# Patient Record
Sex: FEMALE | Race: WHITE | Employment: OTHER | URBAN - METROPOLITAN AREA
[De-identification: names, ages, dates, MRNs, and addresses within clinical notes are randomized per-mention and may not be internally consistent; named-entity substitution may affect disease eponyms.]

---

## 2018-07-19 ENCOUNTER — OFFICE VISIT (OUTPATIENT)
Dept: FAMILY MEDICINE CLINIC | Age: 58
End: 2018-07-19

## 2018-07-19 VITALS
HEART RATE: 68 BPM | HEIGHT: 65 IN | BODY MASS INDEX: 31.32 KG/M2 | DIASTOLIC BLOOD PRESSURE: 59 MMHG | OXYGEN SATURATION: 98 % | WEIGHT: 188 LBS | TEMPERATURE: 97.8 F | SYSTOLIC BLOOD PRESSURE: 107 MMHG | RESPIRATION RATE: 16 BRPM

## 2018-07-19 DIAGNOSIS — Z12.11 SCREENING FOR COLORECTAL CANCER: ICD-10-CM

## 2018-07-19 DIAGNOSIS — R15.9 URINARY AND FECAL INCONTINENCE: Primary | ICD-10-CM

## 2018-07-19 DIAGNOSIS — R32 URINARY AND FECAL INCONTINENCE: Primary | ICD-10-CM

## 2018-07-19 DIAGNOSIS — Z12.31 VISIT FOR SCREENING MAMMOGRAM: ICD-10-CM

## 2018-07-19 DIAGNOSIS — Z12.12 SCREENING FOR COLORECTAL CANCER: ICD-10-CM

## 2018-07-19 DIAGNOSIS — E55.9 VITAMIN D DEFICIENCY: ICD-10-CM

## 2018-07-19 DIAGNOSIS — S09.90XD INJURY OF HEAD, SUBSEQUENT ENCOUNTER: ICD-10-CM

## 2018-07-19 DIAGNOSIS — H54.50: ICD-10-CM

## 2018-07-19 PROBLEM — M19.90 ARTHRITIS: Status: ACTIVE | Noted: 2018-07-19

## 2018-07-19 PROBLEM — S09.90XA HEAD INJURY: Status: ACTIVE | Noted: 2018-07-19

## 2018-07-19 PROBLEM — F31.81 BIPOLAR 2 DISORDER (HCC): Status: ACTIVE | Noted: 2018-07-19

## 2018-07-19 PROBLEM — M72.2 PLANTAR FASCIITIS: Status: ACTIVE | Noted: 2018-07-19

## 2018-07-19 PROBLEM — Z86.69 H/O MIGRAINE: Status: ACTIVE | Noted: 2018-07-19

## 2018-07-19 RX ORDER — TRAZODONE HYDROCHLORIDE 50 MG/1
TABLET ORAL
COMMUNITY

## 2018-07-19 RX ORDER — SERTRALINE HYDROCHLORIDE 50 MG/1
TABLET, FILM COATED ORAL DAILY
COMMUNITY
End: 2022-09-22

## 2018-07-19 RX ORDER — ERGOCALCIFEROL 1.25 MG/1
50000 CAPSULE ORAL
Qty: 4 CAP | Refills: 2 | Status: SHIPPED | OUTPATIENT
Start: 2018-07-19 | End: 2018-10-17

## 2018-07-19 NOTE — ACP (ADVANCE CARE PLANNING)
Advance Care Planning (ACP) Provider Note - Comprehensive     Date of ACP Conversation: 07/19/18  Persons included in Conversation:  patient  Length of ACP Conversation in minutes:  Five minutes              General ACP for ALL Patients with Decision Making Capacity:   Importance of advance care planning, including choosing a healthcare agent to communicate patient's healthcare decisions if patient lost the ability to make decisions, such as after a sudden illness or accident  \"In these circumstances, what matters most to you? \"  Care focused more on comfort or quality of life.         For Serious or Chronic Illness:  Understanding of medical condition      Interventions Provided:  Recommended completion of Advance Directive form after review of ACP materials and conversation with prospective healthcare agent   Recommended communicating the plan and making copies for the healthcare agent, personal physician, and others as appropriate (e.g., health system)  Recommended review of completed ACP document annually or upon change in health status

## 2018-07-19 NOTE — PROGRESS NOTES
Chief Complaint   Patient presents with    Establish Care    Loss of Vision     Rapid Vision Loss within one month     1. Have you been to the ER, urgent care clinic since your last visit? Hospitalized since your last visit? No    2. Have you seen or consulted any other health care providers outside of the [address] [place] [address] since your last visit? Include any pap smears or colon screening.  Yes Where: [facility]

## 2018-07-19 NOTE — PROGRESS NOTES
HISTORY OF PRESENT ILLNESS  Thom Rodriguez is a 59 y.o. female. HPI Comments: Patient is here to establish care. She mentions she is on gap insurance and is homeless. She currently lives in car. Patient does see a psychiatrist at present and she is on medications. Patient is taking her medications. She has had recent blood work done and I have reviewed this. Patient mentions her current issue today is urinary and fecal incontinence. She is unsure if this is attributed to being exposed to pesticides or her trazodone. Patient mentions she does get a signal to go to the bathroom and just passes urine / stool. I have asked her to stop the trazodone and see how she feels. She also mentions on Monday she is going on for pre op work up for cataracts as she is having visual loss. Establish Care   Pertinent negatives include no chest pain, no abdominal pain, no headaches and no shortness of breath. Loss of Vision    Associated symptoms include blurred vision. Pertinent negatives include no discharge, no nausea, no fever, no pain and no dizziness. Review of Systems   Constitutional: Negative for chills, diaphoresis, fever and malaise/fatigue. HENT: Negative for congestion, ear discharge, ear pain, hearing loss, sinus pain and sore throat. Eyes: Positive for blurred vision. Negative for pain and discharge. Respiratory: Negative for cough, sputum production, shortness of breath and wheezing. Cardiovascular: Negative for chest pain, palpitations, claudication and leg swelling. Gastrointestinal: Negative for abdominal pain, diarrhea, heartburn and nausea. Genitourinary: Positive for flank pain and urgency. Negative for dysuria, frequency and hematuria. Musculoskeletal: Negative for joint pain. Neurological: Negative for dizziness and headaches. Endo/Heme/Allergies: Negative for environmental allergies. Psychiatric/Behavioral: Positive for depression and suicidal ideas.  Negative for hallucinations, memory loss and substance abuse. The patient is nervous/anxious and has insomnia. Visit Vitals    /59    Pulse 68    Temp 97.8 °F (36.6 °C) (Oral)    Resp 16    Ht 5' 5\" (1.651 m)    Wt 188 lb (85.3 kg)    SpO2 98%    BMI 31.28 kg/m2         Physical Exam   Constitutional: She is oriented to person, place, and time. She appears well-developed and well-nourished. HENT:   Head: Normocephalic and atraumatic. Right Ear: External ear normal.   Left Ear: External ear normal.   Mouth/Throat: Oropharynx is clear and moist. No oropharyngeal exudate. Eyes: EOM are normal. Pupils are equal, round, and reactive to light. No scleral icterus. Neck: No thyromegaly present. Cardiovascular: Normal rate, regular rhythm and normal heart sounds. Pulmonary/Chest: Effort normal and breath sounds normal. No respiratory distress. She has no wheezes. Abdominal: Soft. Bowel sounds are normal. She exhibits no distension. There is no tenderness. Lymphadenopathy:     She has no cervical adenopathy. Neurological: She is alert and oriented to person, place, and time. Psychiatric: She has a normal mood and affect.        ASSESSMENT and PLAN  Urinary and fecal incontinence :  - Referral to urology   - Please stop trazodone at present to see of this helps your symptoms    Visual decline /cataract :  - Please make sure you follow up with eye doctor    Mammogram screening:   Has been ordered    Colorectal cancer screening :  - Fit test has been handed to patient   - Testing has been explained to patient

## 2018-07-19 NOTE — PROGRESS NOTES
Patient is here to establish care with new pcp. 1. Have you been to the ER, urgent care clinic since your last visit? Hospitalized since your last visit? no    2. Have you seen or consulted any other health care providers outside of the [address] [place] [address] since your last visit? Include any pap smears or colon screening.  no

## 2018-07-24 ENCOUNTER — TELEPHONE (OUTPATIENT)
Dept: FAMILY MEDICINE CLINIC | Age: 58
End: 2018-07-24

## 2022-03-19 PROBLEM — M72.2 PLANTAR FASCIITIS: Status: ACTIVE | Noted: 2018-07-19

## 2022-03-19 PROBLEM — M19.90 ARTHRITIS: Status: ACTIVE | Noted: 2018-07-19

## 2022-03-19 PROBLEM — Z86.69 H/O MIGRAINE: Status: ACTIVE | Noted: 2018-07-19

## 2022-03-20 PROBLEM — S09.90XA HEAD INJURY: Status: ACTIVE | Noted: 2018-07-19

## 2022-03-20 PROBLEM — F31.81 BIPOLAR 2 DISORDER (HCC): Status: ACTIVE | Noted: 2018-07-19

## 2022-10-04 ENCOUNTER — OFFICE VISIT (OUTPATIENT)
Dept: INTERNAL MEDICINE CLINIC | Age: 62
End: 2022-10-04
Payer: MEDICARE

## 2022-10-04 VITALS
WEIGHT: 187.4 LBS | TEMPERATURE: 97.2 F | DIASTOLIC BLOOD PRESSURE: 67 MMHG | RESPIRATION RATE: 16 BRPM | HEIGHT: 64 IN | SYSTOLIC BLOOD PRESSURE: 122 MMHG | OXYGEN SATURATION: 99 % | HEART RATE: 70 BPM | BODY MASS INDEX: 31.99 KG/M2

## 2022-10-04 DIAGNOSIS — N39.3 STRESS INCONTINENCE: ICD-10-CM

## 2022-10-04 DIAGNOSIS — R82.90 ABNORMAL URINE FINDING: ICD-10-CM

## 2022-10-04 DIAGNOSIS — R35.0 FREQUENCY OF URINATION AND POLYURIA: ICD-10-CM

## 2022-10-04 DIAGNOSIS — E66.9 OBESITY, CLASS I, BMI 30-34.9: ICD-10-CM

## 2022-10-04 DIAGNOSIS — R35.89 FREQUENCY OF URINATION AND POLYURIA: ICD-10-CM

## 2022-10-04 DIAGNOSIS — Z11.59 ENCOUNTER FOR HEPATITIS C SCREENING TEST FOR LOW RISK PATIENT: ICD-10-CM

## 2022-10-04 DIAGNOSIS — E78.5 HYPERLIPIDEMIA, UNSPECIFIED HYPERLIPIDEMIA TYPE: ICD-10-CM

## 2022-10-04 DIAGNOSIS — D50.9 IRON DEFICIENCY ANEMIA, UNSPECIFIED IRON DEFICIENCY ANEMIA TYPE: ICD-10-CM

## 2022-10-04 DIAGNOSIS — Z76.89 ESTABLISHING CARE WITH NEW DOCTOR, ENCOUNTER FOR: Primary | ICD-10-CM

## 2022-10-04 DIAGNOSIS — M54.50 ACUTE LOW BACK PAIN WITHOUT SCIATICA, UNSPECIFIED BACK PAIN LATERALITY: ICD-10-CM

## 2022-10-04 DIAGNOSIS — Z12.11 ENCOUNTER FOR SCREENING COLONOSCOPY: ICD-10-CM

## 2022-10-04 PROCEDURE — 99204 OFFICE O/P NEW MOD 45 MIN: CPT | Performed by: INTERNAL MEDICINE

## 2022-10-04 RX ORDER — NAPROXEN 250 MG/1
250 TABLET ORAL 2 TIMES DAILY WITH MEALS
Qty: 20 TABLET | Refills: 0 | Status: SHIPPED | OUTPATIENT
Start: 2022-10-04

## 2022-10-04 RX ORDER — SERTRALINE HYDROCHLORIDE 25 MG/1
TABLET, FILM COATED ORAL DAILY
COMMUNITY

## 2022-10-04 NOTE — PROGRESS NOTES
HISTORY OF PRESENT ILLNESS  Miriam Saul is a 57 y.o. female. Patient comes to establish PCP. Patient tells me she had a recent fall couple of weeks ago she went to [facility] ED, physical examination was benign, she was given naproxen for the back pain and sent home to follow-up with primary care physician. She also would like to have referral to GYN for annual women's evaluation. She noticed increasing urinary frequency, she tells me that they are found to abnormalities in urine analysis recently and she would like to check again since urine frequency is still present. She has not noticed escape of urine when coughing or laughing. She brought records from [facility] 07/02/2021: TSH within normal limits 1.6, cholesterol panel elevated, CMP within normal limits [GFR 59.6] total protein [6.1], hemoglobin 11.2. Recurrent syncope/near syncope  Also cured when standing for prolonged times, she feels strange sensation over her body right before having the near syncope and she sits on the floor so she does not fall. She has followed with cardiologist and neurologist, cardiopathy was ruled out, she got brain MRI and CT head unremarkable per her comments. Stress incontinence  Urine losing when coughing or laughing for a few years so far. She will make appointment with GYN for pessary treatment possibly. Anxiety and depression  She is taking sertraline and trazodone, dose has been decreasing over the years, following with behavioral [facility]. Acute on chronic low back pain  She recently had a fall went to emergency at [facility], given naproxen. She will willing to try physical therapy. Left TMJ  No causing problems at this point.     Anemia 2/2 iron deficiency and history of pernicious anemia on B12 over-the-counter      PAST MEDICAL HISTORY  Medical: Anxiety with depression, pernicious anemia, iron deficiency, left TMJ, back muscle spasms, hyperlipidemia, recurrent vasovagal syncope/near syncope. Surgical: Right shoulder rotator cuff repair. Allergies: Lidocaine and prednisone. Medications: sertraline, trazodone, over-the-counter vitamins. Work: Recently retired. Family: Mother had CHF and C. difficile, dad with osteoarthritis. Social: Denies tobacco, denies alcohol, denied recreational drugs. Sexual: Single, not active, no children, STI negative. Anna  She will make appointment by her own with GYN for annual women's appointment. She will have evaluation Pap smear mammogram to be faxed to our office. Appreciated. Vaccination: Patient refused flu vaccine, Tdap, pneumococcal vaccine, COVID-vaccine. Review of Systems   Respiratory:  Negative for cough and shortness of breath. Cardiovascular:  Negative for chest pain, palpitations and leg swelling. Genitourinary:  Positive for flank pain, frequency and urgency. Visit Vitals  /67 (BP 1 Location: Left upper arm, BP Patient Position: Sitting)   Pulse 70   Temp 97.2 °F (36.2 °C) (Temporal)   Resp 16   Ht 5' 4\" (1.626 m)   Wt 187 lb 6.4 oz (85 kg)   SpO2 99%   BMI 32.17 kg/m²     Physical Exam  Constitutional:       Appearance: Normal appearance. HENT:      Mouth/Throat:      Mouth: Mucous membranes are moist.      Pharynx: Oropharynx is clear. Eyes:      Extraocular Movements: Extraocular movements intact. Conjunctiva/sclera: Conjunctivae normal.      Pupils: Pupils are equal, round, and reactive to light. Cardiovascular:      Rate and Rhythm: Normal rate and regular rhythm. Pulses: Normal pulses. Heart sounds: Normal heart sounds. Pulmonary:      Effort: Pulmonary effort is normal.      Breath sounds: Normal breath sounds. Abdominal:      General: Bowel sounds are normal.      Palpations: Abdomen is soft. Musculoskeletal:      Cervical back: Normal range of motion. Comments: Mild tenderness bilaterally around lumbar spine and midline.   No pain radiation to lower extremities, no weakness in lower extremities, no loss of urinary or fecal his fingers control. Lymphadenopathy:      Cervical: No cervical adenopathy. Skin:     General: Skin is warm. Neurological:      Mental Status: She is alert and oriented to person, place, and time. Psychiatric:         Mood and Affect: Mood normal.       ASSESSMENT and PLAN    ICD-10-CM ICD-9-CM    1. Establishing care with new doctor, encounter for  Z76.89 V65.8       2. Iron deficiency anemia, unspecified iron deficiency anemia type  D50.9 280.9 IRON PROFILE      FERRITIN      3. Abnormal urine finding  R82.90 791.9 URINALYSIS W/ RFLX MICROSCOPIC      4. Frequency of urination and polyuria  R35.0 788.41 URINALYSIS W/ RFLX MICROSCOPIC    R35.89 788.42       5. Stress incontinence  N39.3 [id number]       6. Obesity, Class I, BMI 30-34.9  E66.9 278.00       7. Encounter for hepatitis C screening test for low risk patient  Z11.59 V73.89 HEPATITIS C AB      8. Encounter for screening colonoscopy  Z12.11 V76.51 REFERRAL TO GASTROENTEROLOGY      9. Hyperlipidemia, unspecified hyperlipidemia type  E78.5 272.4 LIPID PANEL      10. Acute low back pain without sciatica, unspecified back pain laterality  M54.50 724.2 REFERRAL TO PHYSICAL THERAPY      naproxen (NAPROSYN) 250 mg tablet        Follow-up and Dispositions    Return in about 1 year (around 10/4/2023) for Also as needed. .     Patient comes to establish PCP. Patient complaining of increased urinary frequency, will repeat urine analysis. She presents symptoms of chronic stress incontinence for the last few years unchanged. She will make appointment with GYN for this. To make appointment with GYN for annual women's evaluation. She is getting refills for trazodone and sertraline from behavioral health clinic at [facility]. Blood work properly ordered. Referral to physical therapy and naproxen prescribed for back pain acute on chronic.   She brought records from [facility] 07/02/2021: TSH within normal limits 1.6, cholesterol panel elevated, CMP within normal limits [GFR 59.6] total protein [6.1], hemoglobin 11.2. Labs sent for scanning. Colonoscopy referral sent. Follow-up in 1 year for annual physical or as needed.

## 2022-10-04 NOTE — PROGRESS NOTES
Joann Dyer is a 57 y.o.  female presents today for office visit for establish care. Pt would also like to discuss back pain. Pt is not fasting. Pt is in Room # 10      1. Have you been to the ER, urgent care clinic since your last visit? Hospitalized since your last visit? No    2. Have you seen or consulted any other health care providers outside of the [address] [place] [address] since your last visit? Include any pap smears or colon screening. No    Upcoming Appts  none    Health Maintenance reviewed     VORB: No orders of the defined types were placed in this encounter.   Cheri Cook LPN

## 2022-10-07 LAB
BACTERIA,BACTU: PRESENT
BILIRUB UR QL: NEGATIVE
CHOLEST SERPL-MCNC: 188 MG/DL (ref 110–200)
CLARITY: CLEAR
COLOR UR: YELLOW
EPITHELIAL,EPSU: ABNORMAL /HPF (ref 0–2)
FE % SATURATION,PSAT: 20 % (ref 20–50)
FERRITIN SERPL-MCNC: 38 NG/ML (ref 10–291)
GLUCOSE UR QL: NEGATIVE MG/DL
HCV AB SER IA-ACNC: NORMAL
HDLC SERPL-MCNC: 3.2 MG/DL (ref 0–5)
HDLC SERPL-MCNC: 58 MG/DL
HGB UR QL STRIP: NEGATIVE
HYLINE CAST, 6014: ABNORMAL /LPF (ref 0–2)
IRON,IRN: 77 MCG/DL (ref 30–160)
KETONES UR QL STRIP.AUTO: NEGATIVE MG/DL
LDL/HDL RATIO,LDHD: 1.9
LDLC SERPL CALC-MCNC: 110 MG/DL (ref 50–99)
LEUKOCYTE ESTERASE: NEGATIVE
NITRITE UR QL STRIP.AUTO: NEGATIVE
NON-HDL CHOLESTEROL, 011976: 130 MG/DL
PH UR STRIP: 5.5 PH (ref 5–8)
PROT UR QL STRIP: NEGATIVE MG/DL
RBC #/AREA URNS HPF: ABNORMAL /HPF
SP GR UR: 1.02 (ref 1–1.03)
TIBC,TIBC: 387 MCG/DL (ref 228–428)
TRIGL SERPL-MCNC: 98 MG/DL (ref 40–149)
UIBC SERPL-MCNC: 310 MCG/DL (ref 110–370)
UROBILINOGEN UR STRIP-MCNC: 0.2 MG/DL
VLDLC SERPL CALC-MCNC: 20 MG/DL (ref 8–30)
WBC URNS QL MICRO: ABNORMAL /HPF (ref 0–5)

## 2022-10-07 NOTE — PROGRESS NOTES
Lipid profile, LDL cholesterol was slightly increased which can be easily controlled by healthy diet, reducing fats in diet reducing sugars and walking 30 minutes every day or most of the days. Iron profile was normal.  No need to use iron supplements. Urinalysis was unremarkable. I believe urinary frequency is related to increased water intake and also stress incontinence. Please follow-up with GYN for management. Please call our office if you have any questions.

## 2022-10-11 ENCOUNTER — HOSPITAL ENCOUNTER (OUTPATIENT)
Dept: PHYSICAL THERAPY | Age: 62
Discharge: HOME | End: 2022-10-11
Payer: MEDICARE

## 2022-10-11 PROCEDURE — 97162 PT EVAL MOD COMPLEX 30 MIN: CPT

## 2022-10-11 NOTE — PROGRESS NOTES
PT DAILY TREATMENT NOTE     Patient Name: Parker El  GZQS:  : 1960  [x]  Patient  Verified  Payor: [place] / Plan: [employer] / Product Type: Managed Care Medicare /    In time:12:25  Out time:1:15  Total Treatment Time (min): 50  Visit #: 1 of 10    Medicare/BCBS Only   Total Timed Codes (min):  25 1:1 Treatment Time:  50       Treatment Area: Other low back pain [M54.59]    SUBJECTIVE  Pain Level (0-10 scale): 6  Any medication changes, allergies to medications, adverse drug reactions, diagnosis change, or new procedure performed?: [x] No    [] Yes (see summary sheet for update)  Subjective functional status/changes:   [] No changes reported    CC: low back pain, impaired gait/mobility  History/Mechanism of Injury: fell onto butt when lifting hospital bed 3-4 weeks ago  MD follow up immediately afterward at ER- no imaging but ruled out major trauma  Current Symptoms/Complaints:   -Low back pain- frequent low back spasms  -Leg aches in anterior groin extending into thigh  Describes frequent falls in the past- frequently catching toes when walking, frequent syncope  Breaks out in cold sweat, feels faint,   Not correlated with eating,   Pain-  Current: 6/10     Worst: 10/10   Best: 6/10  Aggravated By: unable to lift, difficulty with prolonged standing/walking, getting in/out of car  Alleviated By: Naproxen   Previous Treatment/Compliance:   Mobility Devices: none reported  PMHx/Surgical Hx: depression, hx of frequent falls (preceded by excessive sweating, feeling faint), anxiety, visual impairment   Work Hx: former med tech-   Living Situation: lives in apartment with cat, difficulty lifting groceries  Household Modifications:    Hobbies: caring for cat  PLOF: functionally independent,   Limitations to PLOF: difficulty with prolonged standing, walking  Pt Goals: decrease pain    OBJECTIVE    25 min [x]Eval                  []Re-Eval     10 min Therapeutic Exercise:  [] See flow sheet :   Rationale: increase ROM and increase strength to improve the patients ability to decrease muscular spasms in low back    15 min Therapeutic Activity:  []  See flow sheet : Patient education on therapy assessment, prognosis, expectations for therapy sessions, patient goals, autonomic dysregulation, potential reasons for frequent syncope, and HEP. Rationale: to improve the patients ability to adhere to HEP and therapy sessions for increased compliance when working toward therapy goals.             With   [] TE   [x] TA   [] neuro   [] other: Patient Education: [x] Review HEP    [] Progressed/Changed HEP based on:   [] positioning   [] body mechanics   [] transfers   [] heat/ice application    [] other:      Other Objective/Functional Measures:     Observation: standing in increased hip flexion  Palpation: TTP at B lumbar paraspinals, B QL, midline spinous processes in L2-L5 segments    Lumbar AROM:                                           AROM (% of full)                 Right Left Effect   Flexion 100 Dec pain   Extension [facility]. 1\" above knee 1\" above knee    Rotation 75 75                                               -  Strength:   Right (/5) Left (/5)   Hip     Flexion 5 5             Abduction 4 4             Adduction               Extension 4- 4-             ER 4+ 4+             IR 5 5   Knee   Extension 5 5              Flexion 5 5   Ankle   Dorsiflexion 4 4       Directional Preference Testing: prefers flexion to decrease pain    Functional Squat: increased anterior knee translation, lumbar spine maintains extension    Stair Negotiation: reciprocal    Balance: SLS 3\" B    Reflexes/Sensation: denies sensation change    Special Tests:      Right Left   Slump       SLR - -   -   Right Left   Hamstring 90/90 +   +   Ely + +   Marlea Ganser       -   Right Left   ANDREI -      FADDIR -    Hip Scour -    -    Pain Level (0-10 scale) post treatment: 4    ASSESSMENT/Changes in Function: See POC    Patient will continue to benefit from skilled PT services to modify and progress therapeutic interventions, address functional mobility deficits, address ROM deficits, address strength deficits, analyze and address soft tissue restrictions, analyze and cue movement patterns, analyze and modify body mechanics/ergonomics, assess and modify postural abnormalities, address imbalance/dizziness and instruct in home and community integration to attain remaining goals. [x]  See Plan of Care  []  See progress note/recertification  []  See Discharge Summary         Progress towards goals / Updated goals:  See POC    PLAN  [x]  Upgrade activities as tolerated     []  Continue plan of care  [x]  Update interventions per flow sheet       []  Discharge due to:_  []  Other:_      Chandler Dalton 10/11/2022  12:25 PM    No future appointments.

## 2022-10-11 NOTE — PROGRESS NOTES
[address] [facility] AT [facility]   [id number] [place] [address] [address] [address]  Phone: [phone] Fax: [phone] / [address] [address] PHYSICAL THERAPY SERVICES  Patient Name: Carrie Santana : 1960   Medical   Diagnosis: Other low back pain [M54.59] Treatment Diagnosis: Low back pain   Onset Date: 2022     Referral Source: Evaristo Simental [place] [facility]): 10/11/2022   Prior Hospitalization: See medical history Provider #: [id number]   Prior Level of Function: Functionally independent, lives in apartment with cat, walking without AD   Comorbidities:  depression, hx of frequent falls (preceded by excessive sweating, feeling faint), anxiety, visual impairment    Medications: Verified on Patient Summary List   The Plan of Care and following information is based on the information from the initial evaluation.     ========================================================================    Assessment / key information:  Pt is a pleasant 57 y.o. female who presents with c/o low back pain and impaired gait/mobility. The patient reports an acute onset of low back pain following a fall directly onto her butt when lifting a heavy bed; she now describes lingering dull low back pain and lumbar spasms with prolonged standing, lifting, and certain twisting motions. She also reports recurrent hx of near syncope with unknown cause. Signs/symptoms at eval consistent with mechanical low back pain with flexion bias. Functional deficits include: impaired hip strength, impaired SLS balance, low back pain that restricts ADLs. Rehab potential is good due to desire to attain PLOF.  Pt would benefit from skilled PT to address above deficits to improve Pt's function and ability to return to PLOF with decreased pain.      ========================================================================    Eval Complexity: History: HIGH Complexity :3+ comorbidities / personal factors will impact the outcome/ POC Exam:MEDIUM Complexity : 3 Standardized tests and measures addressing body structure, function, activity limitation and / or participation in recreation  Presentation: MEDIUM Complexity : Evolving with changing characteristics  Clinical Decision Making:MEDIUM Complexity : FOTO score of 26-74Overall Complexity:MEDIUM  Problem List: pain affecting function, decrease ROM, decrease strength, impaired gait/ balance, decrease ADL/ functional abilitiies, decrease activity tolerance, decrease flexibility/ joint mobility, and decrease transfer abilities   Treatment Plan may include any combination of the following: Therapeutic exercise, Neuromuscular reeducation, Manual therapy, Therapeutic activity, Self care/home management, Electric stim unattended , Vasopneumatic device, Aquatic therapy, Gait training, Ultrasound, Mechanical traction, Electric stim attended, Needle insertion w/o injection (1 or 2 muscles), Needle insertion w/o injection (3+ muscles), and Canalith repositioning  Patient / Family readiness to learn indicated by: asking questions    Persons(s) to be included in education: patient (P)  Barriers to Learning/Limitations: None  Measures taken:    Patient Goal (s): \"Improve mobility\"   Patient self reported health status: good  Rehabilitation Potential: good    GOALS-  Short Term Goals: To be accomplished in 1 week  - Goal: Pt to be compliant with initial HEP to improve ability to independently manage symptoms. Status at last note/certification: Established and reviewed with Pt  Long Term Goals: To be accomplished in 10 treatments  - Goal: Pt to demo B SLS of at least 10 seconds to improve ease with curb negotiation. Status at last note/certification: 3\" B  - Goal: Pt to report < 4/10 pain at worst to increase ease with ADLs.   Status at last note/certification: 16/56 pain at worst  - Goal: Pt to demonstrate 5/5 B hip extension MMT to increase ease of stair negotiation. Status at last note/certification: 4-/5 B  - Goal: Pt to report FOTO score of at least 57 pts to demonstrate improved function and quality of life. Status at last note/certification: FOTO 37 pts       Frequency / Duration:   -Patient would benefit from skilled PT 2 times per week for 24-36 sessions as needed in this certification period. Goals will be assigned and reassessed every 10 visits/ 30 days per Medicare guidelines     Patient / Caregiver education and instruction: activity modification and exercises    Therapist Signature: Tiffani Montoya Date: 69/24/9203   Certification Period: 10/11/22-1/09/23 Time: 1:34 PM   ========================================================================  I certify that the above Physical Therapy Services are being furnished while the patient is under my care. I agree with the treatment plan and certify that this therapy is necessary. Physician Signature:        Date:       Time: ___                                            Shirley Hamlin,*. Please sign and return to [facility] or you may fax the signed copy to [phone]. Thank you.

## 2022-10-13 ENCOUNTER — HOSPITAL ENCOUNTER (OUTPATIENT)
Dept: PHYSICAL THERAPY | Age: 62
Discharge: HOME | End: 2022-10-13
Payer: MEDICARE

## 2022-10-13 PROCEDURE — 97530 THERAPEUTIC ACTIVITIES: CPT

## 2022-10-13 PROCEDURE — 97110 THERAPEUTIC EXERCISES: CPT

## 2022-10-13 PROCEDURE — 97014 ELECTRIC STIMULATION THERAPY: CPT

## 2022-10-13 PROCEDURE — 97112 NEUROMUSCULAR REEDUCATION: CPT

## 2022-10-13 NOTE — PROGRESS NOTES
PT DAILY TREATMENT NOTE     Patient Name: Sacha Rcoa  FNAS:  : 1960  [x]  Patient  Verified  Payor: Yuridia Margie / Plan: [email or web address] / Product Type: Managed Care Medicare /    In time: 4596  Out time:1115  Total Treatment Time (min): 69  Visit #: 2 of 10    Medicare/[facility] Time Tracking (below)   Total Timed Codes (min):  57 1:1 Treatment Time:  57       Treatment Area: Other low back pain [M54.59]    SUBJECTIVE  Pain Level (0-10 scale): 3-4/10  Any medication changes, allergies to medications, adverse drug reactions, diagnosis change, or new procedure performed?: [x] No    [] Yes (see summary sheet for update)  Subjective functional status/changes:   [] No changes reported  \"I was doing some tasks where I was standing and bending, my back was really hurting me then, Im not picking anything up of great weight, I am trying to be smart about it. My hip really hurts, the stretches I was really feeling it. \"    OBJECTIVE    Modality rationale: decrease pain and increase tissue extensibility to improve the patients ability to perform ADLs and rest comfortably following therapy.    Min Type Additional Details    10 +2 set up [x] Estim:  [x]Unatt       [x]IFC  []Premod                        []Other: []w/ice   [x]w/heat  Position: prone  Location: l/s and glutes      [] Estim: []Att    []TENS instruct  []NMES                    []Other: []w/US   []w/ice   []w/heat  Position:  Location:      []  Traction: [] Cervical       []Lumbar                       [] Prone          []Supine                       []Intermittent   []Continuous Lbs:  [] before manual  [] after manual      []  Ultrasound: []Continuous   [] Pulsed                           []1MHz   []3MHz W/cm2:  Location:      []  Iontophoresis with dexamethasone        Location: [] Take home patch  [] In clinic      []  Ice     []  heat  []  Ice massage  []  Laser  []  Anodyne Position:  Location:      []  Laser with stim  []  Other: Position:  Location:      []  Vasopneumatic Device    []  Right     []  Left  Pre-treatment girth:  Post-treatment girth:  Measured at (location): Pressure:       [] lo [] med [] hi  Temperature: [] lo [] med [] hi    [x] Skin assessment post-treatment:  [x]intact []redness- no adverse reaction    []redness - adverse reaction    22 min Therapeutic Exercise:  [x] See flow sheet :   Rationale: increase ROM and increase strength to improve LE strength/mobility to improve ease of ADLs, household chores, and gait. 10 min Therapeutic Activity:  [x]  See flow sheet :   Rationale: increase strength, improve coordination, improve balance, and increase proprioception to improve the patient's ability to perform transfers, stair negotiation, and functional carries. 25 min Neuromuscular Re-education:  [x]  See flow sheet :   Rationale: increase strength, improve coordination, improve balance and increase proprioception to improve the patients ability to perform functional tasks with improved abdominal brace utilization, improved hip/knee stability, and decreased risk for falls.      NT min Gait Trainin feet x 2 of following-  [] Banded March   [] Banded Lateral     [] Vertical HT  [] Retrograde    [] Banded Monster   [] Dual Task  [] Hurdles          [] Melo Gavel                    [] Ladder Drills  [] Heel Walk      [] Toe Walk   Rationale: improve coordination and gait mechanics to normalize gait pattern for safety with household/community ambulation                                                                  Throughout Patient Education: [x] Review HEP  Updated HEP per pt request for internvetions introduced in PT today    Contraindications and intent of e-stim, pt expressed understanding and agreed to use  Discussed Zynex TENS for home use         Other Objective/Functional Measures:   Initiated POC per FS   SIJ and leg length check: NV      Pain Level (0-10 scale) post treatment: 0    ASSESSMENT/Changes in Function:   Pt seen for 1st treatment since IE. She was motivated and intentional in all interventions. Initial c/o LBP with bridges alleviated with cues for glute set. Plan to progress interventions for abdominal brace strengthening stacked with LE strengthening and stabilizing (lateral walks, squats) and balance (R more difficult than L). Session concluded with E-stim and MHP, pt notes great improvement in sx and desire for home TENS unit. Patient will continue to benefit from skilled PT services to modify and progress therapeutic interventions, address functional mobility deficits, address ROM deficits, address strength deficits, analyze and address soft tissue restrictions, analyze and cue movement patterns, analyze and modify body mechanics/ergonomics, assess and modify postural abnormalities, address imbalance/dizziness, and instruct in home and community integration to attain remaining goals. []  See Plan of Care  []  See progress note/recertification  []  See Discharge Summary         PN due 11/10    Progress towards goals / Updated goals:  Short Term Goals: To be accomplished in 1 week  - Goal: Pt to be compliant with initial HEP to improve ability to independently manage symptoms. Status at last note/certification: Established and reviewed with Pt  Current 10/13/22 pt reports 2x/daily compliance  Long Term Goals: To be accomplished in 10 treatments  - Goal: Pt to demo B SLS of at least 10 seconds to improve ease with curb negotiation. Status at last note/certification: 3\" B  - Goal: Pt to report < 4/10 pain at worst to increase ease with ADLs. Status at last note/certification: 92/48 pain at worst  - Goal: Pt to demonstrate 5/5 B hip extension MMT to increase ease of stair negotiation. Status at last note/certification: 4-/5 B  - Goal: Pt to report FOTO score of at least 57 pts to demonstrate improved function and quality of life.   Status at last note/certification: FOTO 37 pts     PLAN  [x]  Upgrade activities as tolerated     [x]  Continue plan of care  []  Update interventions per flow sheet       []  Discharge due to:_  [x]  Other:_  schedule more visits    Tye Solano, PT 10/13/2022  8:34 AM    Future Appointments   Date Time Provider Hao Gibson   10/13/2022 10:00 AM Obdulia Ac, PT [facility]   10/19/2022  9:15 AM [facility] PT [place] 2 [facility]   10/21/2022  9:30 AM [facility] PT [place] 2 [facility]

## 2022-10-19 ENCOUNTER — HOSPITAL ENCOUNTER (OUTPATIENT)
Dept: PHYSICAL THERAPY | Age: 62
Discharge: HOME | End: 2022-10-19
Payer: MEDICARE

## 2022-10-19 PROCEDURE — 97112 NEUROMUSCULAR REEDUCATION: CPT | Performed by: PHYSICAL THERAPIST

## 2022-10-19 PROCEDURE — 97014 ELECTRIC STIMULATION THERAPY: CPT | Performed by: PHYSICAL THERAPIST

## 2022-10-19 PROCEDURE — 97140 MANUAL THERAPY 1/> REGIONS: CPT | Performed by: PHYSICAL THERAPIST

## 2022-10-19 PROCEDURE — 97110 THERAPEUTIC EXERCISES: CPT | Performed by: PHYSICAL THERAPIST

## 2022-10-19 PROCEDURE — 97530 THERAPEUTIC ACTIVITIES: CPT | Performed by: PHYSICAL THERAPIST

## 2022-10-19 NOTE — PROGRESS NOTES
PT DAILY TREATMENT NOTE     Patient Name: Carrie Santana  Date:10/19/2022  : 1960  [x]  Patient  Verified  Payor: Yovany Likes / [email or web address] / Product Type: Managed Care Medicare /    In time: 581 am  Out time: 1034am  Total Treatment Time (min): 81  Visit #: 3 of 10    Medicare/[facility] Time Tracking (below)   Total Timed Codes (min):  66 1:1 Treatment Time:  66       Treatment Area: Other low back pain [M54.59]    SUBJECTIVE  Pain Level (0-10 scale): l/s 0/10, hip 6  Any medication changes, allergies to medications, adverse drug reactions, diagnosis change, or new procedure performed?: [x] No    [] Yes (see summary sheet for update)  Subjective functional status/changes:   [] No changes reported  \"Pt reports she looked up sx of POTS and she thinks that's what has caused most of her sx over the years. I had some mm soreness after my initial session. I've been doing some ankle exercises at home for balance. \"    OBJECTIVE    Modality rationale: decrease pain and increase tissue extensibility to improve the patients ability to perform ADLs and rest comfortably following therapy.    Min Type Additional Details    13 +2 set up [x] Estim:  [x]Unatt       [x]IFC  []Premod                        []Other: []w/ice   [x]w/heat  Position: prone  Location: l/s and glutes      [] Estim: []Att    []TENS instruct  []NMES                    []Other: []w/US   []w/ice   []w/heat  Position:  Location:      []  Traction: [] Cervical       []Lumbar                       [] Prone          []Supine                       []Intermittent   []Continuous Lbs:  [] before manual  [] after manual      []  Ultrasound: []Continuous   [] Pulsed                           []1MHz   []3MHz W/cm2:  Location:      []  Iontophoresis with dexamethasone        Location: [] Take home patch  [] In clinic      []  Ice     []  heat  []  Ice massage  []  Laser  []  Anodyne Position:  Location:      []  Laser with stim  [] Other: Position:  Location:      []  Vasopneumatic Device    []  Right     []  Left  Pre-treatment girth:  Post-treatment girth:  Measured at (location): Pressure:       [] lo [] med [] hi  Temperature: [] lo [] med [] hi    [x] Skin assessment post-treatment:  [x]intact []redness- no adverse reaction    []redness - adverse reaction    25 min Therapeutic Exercise:  [x] See flow sheet :assessed on Nu Step    Rationale: increase ROM and increase strength to improve LE strength/mobility to improve ease of ADLs, household chores, and gait. 13 min Therapeutic Activity:  [x]  See flow sheet :   Rationale: increase strength, improve coordination, improve balance, and increase proprioception to improve the patient's ability to perform transfers, stair negotiation, and functional carries. 20 min Neuromuscular Re-education:  [x]  See flow sheet :   Rationale: increase strength, improve coordination, improve balance and increase proprioception to improve the patients ability to perform functional tasks with improved abdominal brace utilization, improved hip/knee stability, and decreased risk for falls. 8 min Manual Therapy:  alignment check, METs, R LE distraction, R ant rot noted    The manual therapy interventions were performed at a separate and distinct time from the therapeutic activities interventions. Rationale: decrease pain, increase ROM, increase tissue extensibility, decrease trigger points, and increase postural awareness to allow for improved gait and transfers.   NT min Gait Trainin feet x 2 of following-  [] Banded March   [] Banded Lateral     [] Vertical HT  [] Retrograde    [] Banded Monster   [] Dual Task  [] Hurdles          [] Melo Gavel                    [] Ladder Drills  [] Heel Walk      [] Toe Walk   Rationale: improve coordination and gait mechanics to normalize gait pattern for safety with household/community ambulation Throughout Patient Education: [x] Review HEP  Updated HEP per pt request for internvetions introduced in PT today    Contraindications and intent of e-stim, pt expressed understanding and agreed to use  Discussed Zynex TENS for home use         Other Objective/Functional Measures:      SIJ and leg length check: r ant rot noted with long sit test,  METS performed  Abdominal bracing:   SLS: progressed to slow march in II bars  Added 1/2 standing donkey kicks   Progressed clams to SL no TB today    Pain Level (0-10 scale) post treatment: 3    ASSESSMENT/Changes in Function:   Patient reports her syncope sx seem to be consistent with POS, and reports she has been doing some research on it. Pt has a hx (13x) of brain injuries/concussions and falls. Pt reports one MD over the years dx her with scoliosis. Pt reports R deep groin wrapping around into buttocks, and it feels like her leg needs Pulling, like its jammed. 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 Pt did well with increased hip strengthening and balance progression today with decreased Reagan SLS noted and cues for decreased Trendelenburg with R SLS. Reports increased hip sx in stabilizing leg with 1/2 standing donkey kicks: may need to perform prone nv. Pt continues to report good pain relief with E-stim and would like to look into home TENS unit. Pt scheduled more apts today. Patient will continue to benefit from skilled PT services to modify and progress therapeutic interventions, address functional mobility deficits, address ROM deficits, address strength deficits, analyze and address soft tissue restrictions, analyze and cue movement patterns, analyze and modify body mechanics/ergonomics, assess and modify postural abnormalities, address imbalance/dizziness, and instruct in home and community integration to attain remaining goals.      [x]  See Plan of Care  []  See progress note/recertification  []  See Discharge Summary         PN due 11/10    Progress towards goals / Updated goals:  Short Term Goals: To be accomplished in 1 week  - Goal: Pt to be compliant with initial HEP to improve ability to independently manage symptoms. Status at last note/certification: Established and reviewed with Pt  Current 10/13/22 pt reports 2x/daily compliance  Long Term Goals: To be accomplished in 10 treatments  - Goal: Pt to demo B SLS of at least 10 seconds to improve ease with curb negotiation. Status at last note/certification: 3\" B  Current: ongoing with cues to reduce R trendelenburg needed  10/19/22  - Goal: Pt to report < 4/10 pain at worst to increase ease with ADLs. Status at last note/certification: 12/34 pain at worst  - Goal: Pt to demonstrate 5/5 B hip extension MMT to increase ease of stair negotiation. Status at last note/certification: 4-/5 B  - Goal: Pt to report FOTO score of at least 57 pts to demonstrate improved function and quality of life.   Status at last note/certification: FOTO 37 pts     PLAN  [x]  Upgrade activities as tolerated     [x]  Continue plan of care  []  Update interventions per flow sheet       []  Discharge due to:_  []  Other:_      Tere Ricks, PT 10/19/2022  8:34 AM    Future Appointments   Date Time Provider Hao Gibson   10/19/2022  9:15 AM [facility] PT [place] 2 [facility]   10/21/2022  9:30 AM [facility] PT [place] 2 [facility]

## 2022-10-21 ENCOUNTER — HOSPITAL ENCOUNTER (OUTPATIENT)
Dept: PHYSICAL THERAPY | Age: 62
End: 2022-10-21
Payer: MEDICARE

## 2022-10-24 ENCOUNTER — HOSPITAL ENCOUNTER (OUTPATIENT)
Dept: PHYSICAL THERAPY | Age: 62
Discharge: HOME | End: 2022-10-24
Payer: MEDICARE

## 2022-10-24 PROCEDURE — 97014 ELECTRIC STIMULATION THERAPY: CPT

## 2022-10-24 PROCEDURE — 97110 THERAPEUTIC EXERCISES: CPT

## 2022-10-24 PROCEDURE — 97112 NEUROMUSCULAR REEDUCATION: CPT

## 2022-10-24 NOTE — PROGRESS NOTES
PT DAILY TREATMENT NOTE     Patient Name: Sakshi Rodriguez  VXOB:  : 1960  [x]  Patient  Verified  Payor: Jaydon Burn / [facility] / Product Type: Managed Care Medicare /    In time: 1001   Out time:1057   Total Treatment Time (min): 56   Visit #: 4 of 10    Medicare/[facility] Time Tracking (below)   Total Timed Codes (min):  56  1:1 Treatment Time:  41        Treatment Area: Other low back pain [M54.59]    SUBJECTIVE  Pain Level (0-10 scale): 0/10 L/s, 6/10 R hip  Any medication changes, allergies to medications, adverse drug reactions, diagnosis change, or new procedure performed?: [x] No    [] Yes (see summary sheet for update)  Subjective functional status/changes:   [] No changes reported  Pt apologetic for missing last appt. Reports that she did well following last appt and cont to have less LBP, noting that she mostly has R hip pain at this time, reduced w/ stretching of the thigh (demonstrates a modified pipo stretch in supine). Pt denies any falls since last visit. OBJECTIVE    Modality rationale: decrease pain and increase tissue extensibility to improve the patients ability to perform ADLs and rest comfortably following therapy.    Min Type Additional Details    15 [x] Estim:  [x]Unatt       [x]IFC  []Premod                        []Other: []w/ice   [x]w/heat  Position: prone  Location: l/s and glutes, 2 layers between skin and heat      [] Estim: []Att    []TENS instruct  []NMES                    []Other: []w/US   []w/ice   []w/heat  Position:  Location:      []  Traction: [] Cervical       []Lumbar                       [] Prone          []Supine                       []Intermittent   []Continuous Lbs:  [] before manual  [] after manual      []  Ultrasound: []Continuous   [] Pulsed                           []1MHz   []3MHz W/cm2:  Location:      []  Iontophoresis with dexamethasone        Location: [] Take home patch  [] In clinic      []  Ice []  heat  []  Ice massage  []  Laser  []  Anodyne Position:  Location:      []  Laser with stim  []  Other: Position:  Location:      []  Vasopneumatic Device    []  Right     []  Left  Pre-treatment girth:  Post-treatment girth:  Measured at (location): Pressure:       [] lo [] med [] hi  Temperature: [] lo [] med [] hi    [x] Skin assessment post-treatment:  [x]intact []redness- no adverse reaction    []redness - adverse reaction    26 min Therapeutic Exercise:  [x] See flow sheet : added prone quad stretch, s/l clams, supine hip flex stretch, 1/2 stand donkey kick   Rationale: increase ROM and increase strength to improve LE strength/mobility to improve ease of ADLs, household chores, and gait. min Therapeutic Activity:  [x]  See flow sheet :   Rationale: increase strength, improve coordination, improve balance, and increase proprioception to improve the patient's ability to perform transfers, stair negotiation, and functional carries. 15 min Neuromuscular Re-education:  [x]  See flow sheet : initiated rocker board   Added foam to tandem stance   Rationale: increase strength, improve coordination, improve balance and increase proprioception to improve the patients ability to perform functional tasks with improved abdominal brace utilization, improved hip/knee stability, and decreased risk for falls. NC min Manual Therapy:  alignment check, innominate rotation normal, leg length in supine and hook-lying symmetrical   The manual therapy interventions were performed at a separate and distinct time from the therapeutic activities interventions. Rationale: decrease pain, increase ROM, increase tissue extensibility, decrease trigger points, and increase postural awareness to allow for improved gait and transfers.   NT min Gait Trainin feet x 2 of following-  [] Banded March   [] Banded Lateral     [] Vertical HT  [] Retrograde    [] Banded Monster   [] Dual Task  [] Hurdles          [] Asad Anderson [] Ladder Drills  [] Heel Walk      [] Toe Walk   Rationale: improve coordination and gait mechanics to normalize gait pattern for safety with household/community ambulation                                                                  Throughout Patient Education: [x] Review HEP and intent of progression, activity performed this session  Reviewed  TENS for home use, pt verbalizes interest         Other Objective/Functional Measures:   SLS: 2-5 seconds RLE           8-10 seconds LLE   Tandem: L leg in front: 30 sec                 R leg in front: 30 sec  Progressed to tandem on foam: RLE in front: 27 sec                                                      LLE in front: 30 sec  Progressed clams to SL no TB today    + Initiated rocker board for anteroposterior  and mediolateral weight shifts, ankle/hip strategy    Pain Level (0-10 scale) post treatment: 0/10    ASSESSMENT/Changes in Function:   Pt with good tolerance to activity progression noted today. Appeared to have restrictions in R quad, hip flexor on the R as compared to left, responding well to addition of hip flexor and quad stretch. Pt reporting 0/10 pain post tx. Cont to be interested in Tens unit and may benefit from home unit to manage S&S. PT discussed lower chain anatomy and weakness of glute med effects on LBP and LE mechanics. Pt verbalized understanding and gratitude for explanation. Will cont to progress pt w/ functional/weight bearing strengthening of the glute med, progressions, as tolerated.      Patient will continue to benefit from skilled PT services to modify and progress therapeutic interventions, address functional mobility deficits, address ROM deficits, address strength deficits, analyze and address soft tissue restrictions, analyze and cue movement patterns, analyze and modify body mechanics/ergonomics, assess and modify postural abnormalities, address imbalance/dizziness, and instruct in home and community integration to attain remaining goals. [x]  See Plan of Care  []  See progress note/recertification  []  See Discharge Summary         PN due 11/10    Progress towards goals / Updated goals:  Short Term Goals: To be accomplished in 1 week  - Goal: Pt to be compliant with initial HEP to improve ability to independently manage symptoms. Status at last note/certification: Established and reviewed with Pt  Current 10/13/22 pt reports 2x/daily compliance  Long Term Goals: To be accomplished in 10 treatments  - Goal: Pt to demo B SLS of at least 10 seconds to improve ease with curb negotiation. Status at last note/certification: 3\" B  Current: 10/24/22: on goin-5 seconds RLE, 8-10 seconds LLE , trendelenburg on RLE  - Goal: Pt to report < 4/10 pain at worst to increase ease with ADLs. Status at last note/certification: 23/58 pain at worst  - Goal: Pt to demonstrate 5/5 B hip extension MMT to increase ease of stair negotiation. Status at last note/certification: 4-/5 B  - Goal: Pt to report FOTO score of at least 57 pts to demonstrate improved function and quality of life.   Status at last note/certification: FOTO 37 pts     PLAN  [x]  Upgrade activities as tolerated     [x]  Continue plan of care  []  Update interventions per flow sheet       []  Discharge due to:_  []  Other:_      The Winifred, PT 10/24/2022  1057     Future Appointments   Date Time Provider Hao Gibson   10/24/2022 10:00 AM [facility] PT [place] 2 [facility]   10/27/2022 11:30 AM Shelby Bueno, PT [facility]   10/31/2022  9:15 AM Brittni Hess, PT [facility]   2022  9:15 AM Abbe Puentes, PT [facility]

## 2022-10-27 ENCOUNTER — HOSPITAL ENCOUNTER (OUTPATIENT)
Dept: PHYSICAL THERAPY | Age: 62
Discharge: HOME | End: 2022-10-27
Payer: MEDICARE

## 2022-10-27 PROCEDURE — 97014 ELECTRIC STIMULATION THERAPY: CPT

## 2022-10-27 PROCEDURE — 97112 NEUROMUSCULAR REEDUCATION: CPT

## 2022-10-27 PROCEDURE — 97110 THERAPEUTIC EXERCISES: CPT

## 2022-10-27 NOTE — PROGRESS NOTES
PT DAILY TREATMENT NOTE     Patient Name: Dana Agosto  VLAS:  : 1960  [x]  Patient  Verified  Payor: Lisa Beavers / Plan: [employer] / Product Type: Managed Care Medicare /    In time:11:40  Out time:12:37  Total Treatment Time (min): 57  Visit #: 5 of 10    Medicare/BCBS Only   Total Timed Codes (min):  41 1:1 Treatment Time:  41       Treatment Area: Other low back pain [M54.59]    SUBJECTIVE  Pain Level (0-10 scale): 10  Any medication changes, allergies to medications, adverse drug reactions, diagnosis change, or new procedure performed?: [x] No    [] Yes (see summary sheet for update)  Subjective functional status/changes:   [] No changes reported  \"I am sore all over today\"    OBJECTIVE    Modality rationale: decrease pain and increase tissue extensibility to improve the patients ability to perform ADLs and rest comfortably following therapy.     Min Type Additional Details   15 + 1 [x] Estim:  [x]Unatt       [x]IFC  []Premod                        []Other:  []w/ice   []w/heat  Position: prone  Location: low back    [] Estim: []Att    []TENS instruct  []NMES                    []Other:  []w/US   []w/ice   []w/heat  Position:   Location:     []  Traction: [] Cervical       []Lumbar                       [] Prone          []Supine                       []Intermittent   []Continuous Lbs:  [] before manual  [] after manual    []  Ultrasound: []Continuous   [] Pulsed                           []1MHz   []3MHz W/cm2:  Location:    []  Iontophoresis with dexamethasone         Location: [] Take home patch   [] In clinic    []  Ice     []  heat  []  Ice massage  []  Laser   []  Anodyne Position:   Location:     []  Laser with stim  []  Other:  Position:  Location:    []  Vasopneumatic Device    []  Right     []  Left  Pre-treatment girth:  Post-treatment girth:  Measured at (location):  Pressure:       [] lo [] med [] hi   Temperature: [] lo [] med [] hi   [x] Skin assessment post-treatment:  [x]intact []redness- no adverse reaction    []redness - adverse reaction:     14 min Therapeutic Exercise:  [x] See flow sheet :   Rationale: increase ROM and increase strength to improve LE strength/mobility and  lumbar mobility to improve ease of ADLs and gait. 27 min Neuromuscular Re-education:  [x]  See flow sheet :   Rationale: increase strength, improve coordination, improve balance and increase proprioception  to improve the patients ability to perform functional tasks with improved abdominal brace utilization, improved control, and decreased risk for falls. With   [] TE   [] TA   [] neuro   [] other: Patient Education: [x] Review HEP    [] Progressed/Changed HEP based on:   [] positioning   [] body mechanics   [] transfers   [] heat/ice application    [] other:      Other Objective/Functional Measures:   -Added \"Avoid the Spear\" to challenge hip balance strategy and reaction time     Pain Level (0-10 scale) post treatment: 5    ASSESSMENT/Changes in Function: Pt noting less pain/spasms in lumbar spine since [facility] but now reports increased right gluteal pain. She notes incremental improvements in balance but remains challenged by SLS. She notes one episode of LOB at home that she was able to recover through hip strategy. Patient demonstrates improving hip balance strategy with progression of avoid the spear interventions but has difficulty extending her hip with balance well maintained. Patient will continue to benefit from skilled PT services to modify and progress therapeutic interventions, address functional mobility deficits, address ROM deficits, address strength deficits, analyze and address soft tissue restrictions, analyze and cue movement patterns, analyze and modify body mechanics/ergonomics, assess and modify postural abnormalities and address imbalance/dizziness to attain remaining goals.      []  See Plan of Care  []  See progress note/recertification  [] See Discharge Summary         Progress towards goals / Updated goals:  Short Term Goals: To be accomplished in 1 week  - Goal: Pt to be compliant with initial HEP to improve ability to independently manage symptoms. Status at last note/certification: Established and reviewed with Pt  Current 10/13/22 pt reports 2x/daily compliance  Long Term Goals: To be accomplished in 10 treatments  - Goal: Pt to demo B SLS of at least 10 seconds to improve ease with curb negotiation. Status at last note/certification: 3\" B  Current: 10/24/22: on goin-5 seconds RLE, 8-10 seconds LLE , trendelenburg on RLE  - Goal: Pt to report < 4/10 pain at worst to increase ease with ADLs. Status at last note/certification: 93/07 pain at worst  - Goal: Pt to demonstrate 5/5 B hip extension MMT to increase ease of stair negotiation. Status at last note/certification: 4-/5 B  - Goal: Pt to report FOTO score of at least 57 pts to demonstrate improved function and quality of life.   Status at last note/certification: FOTO 37 pts     PLAN  [x]  Upgrade activities as tolerated     [x]  Continue plan of care  []  Update interventions per flow sheet       []  Discharge due to:_  []  Other:_      [place] 10/27/2022  7:39 AM    Future Appointments   Date Time Provider Hao Gibson   10/27/2022 11:30 AM [facility]   10/31/2022  9:15 AM Mike Bland, PT [facility]   2022  9:15 AM Nicole Bennett, PT [facility]

## 2022-10-31 ENCOUNTER — HOSPITAL ENCOUNTER (OUTPATIENT)
Dept: PHYSICAL THERAPY | Age: 62
Discharge: HOME | End: 2022-10-31
Payer: MEDICARE

## 2022-10-31 PROCEDURE — 97110 THERAPEUTIC EXERCISES: CPT

## 2022-10-31 PROCEDURE — 97112 NEUROMUSCULAR REEDUCATION: CPT

## 2022-10-31 PROCEDURE — 97530 THERAPEUTIC ACTIVITIES: CPT

## 2022-10-31 PROCEDURE — 97014 ELECTRIC STIMULATION THERAPY: CPT

## 2022-10-31 NOTE — PROGRESS NOTES
PT DAILY TREATMENT NOTE     Patient Name: Elin Terry  OYPU:  : 1960  [x]  Patient  Verified  Payor: Mary Ferreira / [facility] [place] / Product Type: Managed Care Medicare /    In time: 208 Out time:1025  Total Treatment Time (min):70  Visit #: 6 of 10    Medicare/BCBS Only   Total Timed Codes (min):  54 1:1 Treatment Time:  54       Treatment Area: Other low back pain [M54.59]    SUBJECTIVE  Pain Level (0-10 scale): 2/10 in R hip, 3/10 in l/s  Any medication changes, allergies to medications, adverse drug reactions, diagnosis change, or new procedure performed?: [x] No    [] Yes (see summary sheet for update)  Subjective functional status/changes:   [] No changes reported  Pt notes improvement in R hip pain with performing resisted clamshells and stretching (particularly pipo stretch). Pain in hip with sit to stand transfers, laying down while sleeping at night. Pt notes difficulty getting up off the floor after kneeling down to clean over. OBJECTIVE    Modality rationale: decrease pain and increase tissue extensibility to improve the patients ability to perform ADLs and rest comfortably following therapy.     Min Type Additional Details   15 + 1 [x] Estim:  [x]Unatt       [x]IFC  []Premod                        []Other:  []w/ice   []w/heat  Position: prone  Location: low back    [] Estim: []Att    []TENS instruct  []NMES                    []Other:  []w/US   []w/ice   []w/heat  Position:   Location:     []  Traction: [] Cervical       []Lumbar                       [] Prone          []Supine                       []Intermittent   []Continuous Lbs:  [] before manual  [] after manual    []  Ultrasound: []Continuous   [] Pulsed                           []1MHz   []3MHz W/cm2:  Location:    []  Iontophoresis with dexamethasone         Location: [] Take home patch   [] In clinic    []  Ice     []  heat  []  Ice massage  []  Laser   []  Anodyne Position: Location:     []  Laser with stim  []  Other:  Position:  Location:    []  Vasopneumatic Device    []  Right     []  Left  Pre-treatment girth:  Post-treatment girth:  Measured at (location):  Pressure:       [] lo [] med [] hi   Temperature: [] lo [] med [] hi   [x] Skin assessment post-treatment:  [x]intact []redness- no adverse reaction    []redness - adverse reaction:     15 min Therapeutic Exercise:  [x] See flow sheet :  Added standing fire hydrants   Rationale: increase ROM and increase strength to improve LE strength/mobility and  lumbar mobility to improve ease of ADLs and gait. 25 min Therapeutic Activity:  [x]  See flow sheet :  Added sit to stand from med plinth, with glute set (GTB for hip abd NV)   High lunges in P bars in prep for floor <> stand transfers   Rationale: increase strength, improve coordination, improve balance, and increase proprioception to improve the patient's ability to perform transfers, stair negotiation      14 min Neuromuscular Re-education:  [x]  See flow sheet :  Bridges with staggered stance LEs  Added Tandem, EC on floor   Rationale: increase strength, improve coordination, improve balance and increase proprioception  to improve the patients ability to perform functional tasks with improved abdominal brace utilization, improved control, and decreased risk for falls. With   [] TE   [] TA   [] neuro   [] other: Patient Education: [x] Review HEP      Morning stretching routine to offset LBP     Other Objective/Functional Measures:     Pain Level (0-10 scale) post treatment: 0    ASSESSMENT/Changes in Function: Pt cont to describe muscular spasms, stiffness, and pain across l/s and glutes. Staggered stance bridges initially incr pain/discomfort however alleviated with incr pressure through heals and reps. Pt notes improved LBP with cues for glute set prior to stand from sit.      Patient will continue to benefit from skilled PT services to modify and progress therapeutic interventions, address functional mobility deficits, address ROM deficits, address strength deficits, analyze and address soft tissue restrictions, analyze and cue movement patterns, analyze and modify body mechanics/ergonomics, assess and modify postural abnormalities and address imbalance/dizziness to attain remaining goals. []  See Plan of Care  []  See progress note/recertification  []  See Discharge Summary         Progress towards goals / Updated goals:  Short Term Goals: To be accomplished in 1 week  - Goal: Pt to be compliant with initial HEP to improve ability to independently manage symptoms. Status at last note/certification: Established and reviewed with Pt  Current 10/13/22 pt reports 2x/daily compliance  Long Term Goals: To be accomplished in 10 treatments  - Goal: Pt to demo B SLS of at least 10 seconds to improve ease with curb negotiation. Status at last note/certification: 3\" B  Current: 10/24/22: on goin-5 seconds RLE, 8-10 seconds LLE , trendelenburg on RLE  - Goal: Pt to report < 4/10 pain at worst to increase ease with ADLs. Status at last note/certification: 34/32 pain at worst  - Goal: Pt to demonstrate 5/5 B hip extension MMT to increase ease of stair negotiation. Status at last note/certification: 4-/5 B  Current 10/31/22 addressed with staggered stance bridges, incr reps of donkey kicks  - Goal: Pt to report FOTO score of at least 57 pts to demonstrate improved function and quality of life.   Status at last note/certification: FOTO 37 pts     PLAN  [x]  Upgrade activities as tolerated     [x]  Continue plan of care  []  Update interventions per flow sheet       []  Discharge due to:_  [x]  Other:_  assess carry over for sit to stand, practice floor <>stand transfers    Sergey Petersen, PT 10/31/2022  7:39 AM    Future Appointments   Date Time Provider Hao Gibson   10/31/2022  9:15 AM Janes Wood, PT [facility]   2022  9:15 AM [facility] PT CINDY 2 [facility]

## 2022-11-02 ENCOUNTER — HOSPITAL ENCOUNTER (OUTPATIENT)
Dept: PHYSICAL THERAPY | Age: 62
Discharge: HOME | End: 2022-11-02
Payer: MEDICARE

## 2022-11-02 PROCEDURE — 97530 THERAPEUTIC ACTIVITIES: CPT

## 2022-11-02 PROCEDURE — 97014 ELECTRIC STIMULATION THERAPY: CPT

## 2022-11-02 PROCEDURE — 97110 THERAPEUTIC EXERCISES: CPT

## 2022-11-02 NOTE — PROGRESS NOTES
PT DAILY TREATMENT NOTE     Patient Name: Judy Collins  Date:2022  : 1960  [x]  Patient  Verified  Payor: Andressa Abreu / [facility] / Product Type: Managed Care Medicare /    In time: 7777  Out time: 1026    Total Treatment Time (min):68   Visit #: 7 of 10    Medicare/BCBS Only   Total Timed Codes (min): 68  1:1 Treatment Time:  53        Treatment Area: Other low back pain [M54.59]    SUBJECTIVE  Pain Level (0-10 scale): 1-2/10 overall, \"just stiff in my back\"  Any medication changes, allergies to medications, adverse drug reactions, diagnosis change, or new procedure performed?: [x] No    [] Yes (see summary sheet for update)  Subjective functional status/changes:   [] No changes reported  Pt reports that she did well following last session but when she got home, she \"felt funny\". Pt reports that the time was 1230. She noted that she was tired and felt \"fainty\". Pt noted that her HR was between 55-99 bpm sitting at rest. Pt noted that she lay down to rest, \"blacked out\", unable to recall how long but \"probably a couple of minutes\". Pt notes that she has a phone call into the doctor and Is awaiting a visit. Notes compliance w/ HEP w/ her bands. OBJECTIVE    Modality rationale: decrease pain and increase tissue extensibility to improve the patients ability to perform ADLs and rest comfortably following therapy.     Min Type Additional Details   15 [x] Estim:  [x]Unatt       [x]IFC  []Premod                        []Other:  []w/ice   [x]w/heat, 2 layers between skin and heat  Position: prone  Location: low back    [] Estim: []Att    []TENS instruct  []NMES                    []Other:  []w/US   []w/ice   []w/heat  Position:   Location:     []  Traction: [] Cervical       []Lumbar                       [] Prone          []Supine                       []Intermittent   []Continuous Lbs:  [] before manual  [] after manual    []  Ultrasound: []Continuous   [] Pulsed []1MHz   []3MHz W/cm2:  Location:    []  Iontophoresis with dexamethasone         Location: [] Take home patch   [] In clinic    []  Ice     []  heat  []  Ice massage  []  Laser   []  Anodyne Position:   Location:     []  Laser with stim  []  Other:  Position:  Location:    []  Vasopneumatic Device    []  Right     []  Left  Pre-treatment girth:  Post-treatment girth:  Measured at (location):  Pressure:       [] lo [] med [] hi   Temperature: [] lo [] med [] hi   [x] Skin assessment post-treatment:  [x]intact []redness- no adverse reaction    []redness - adverse reaction:     23 min Therapeutic Exercise:  [x] See flow sheet :     Rationale: increase ROM and increase strength to improve LE strength/mobility and  lumbar mobility to improve ease of ADLs and gait. 30 min Therapeutic Activity:  [x]  See flow sheet :  Floor transfers  Sit<>stand from standard chair, no arms, neutral LE position   Rationale: increase strength, improve coordination, improve balance, and increase proprioception to improve the patient's ability to perform transfers, stair negotiation       min Neuromuscular Re-education:  [x]  See flow sheet :   Rationale: increase strength, improve coordination, improve balance and increase proprioception  to improve the patients ability to perform functional tasks with improved abdominal brace utilization, improved control, and decreased risk for falls. With   [x] TE   [] TA   [] neuro   [] other: Patient Education: [x] Review HEP, importance of cont monitoring of S&S and medical follow up.           Other Objective/Functional Measures:   - pt reported reduction in overall S&S, reduced \"stiffness in the l/s following the stretches\" at the start of the session   - verbal cues for knees neutral, no valgus, w/ sit<>stand from chair, dig through heels (arms across chest, standard chair)  - addressed sit<>stand from floor: stand to tall kneel to 1/2 kneel to sit on the ground, reverse to stand. Initially required one hand on external table to rise but was able to repeat for a second time, indep, w/ hands on the L knee (in half kneel, pt places L foot forward)  - R hip pain noted w/ transfers on the floor , pt requesting to cont w/ trying transfers, performed x 2    Pain Level (0-10 scale) post treatment:   1-2/10    ASSESSMENT/Changes in Function: Pt noted to have good recall of sit<>stand technique during transfer from standard chair. Min v.c. initially for maintaining hip/knee neutral to prevent valgus of the LE. Good recall w/ PT cues. Pt w/ indep ability to perform floor transfers but does appear more comfortable w/ use of the hands on an external source to push to rise. Anticipate pt cont to improve w/ floor transfers as her hip strength improves. Pt noted to report fatigue w/ prone hip ext. Patient will continue to benefit from skilled PT services to modify and progress therapeutic interventions, address functional mobility deficits, address ROM deficits, address strength deficits, analyze and address soft tissue restrictions, analyze and cue movement patterns, analyze and modify body mechanics/ergonomics, assess and modify postural abnormalities and address imbalance/dizziness to attain remaining goals. []  See Plan of Care  []  See progress note/recertification  []  See Discharge Summary         Progress towards goals / Updated goals:  Short Term Goals: To be accomplished in 1 week  - Goal: Pt to be compliant with initial HEP to improve ability to independently manage symptoms. Status at last note/certification: Established and reviewed with Pt  Current 10/13/22 pt reports 2x/daily compliance  Long Term Goals: To be accomplished in 10 treatments  - Goal: Pt to demo B SLS of at least 10 seconds to improve ease with curb negotiation.   Status at last note/certification: 3\" B  Current: 10/24/22: on goin-5 seconds RLE, 8-10 seconds LLE , trendelenburg on RLE  - Goal: Pt to report < 4/10 pain at worst to increase ease with ADLs. Status at last note/certification: 94/79 pain at worst  - Goal: Pt to demonstrate 5/5 B hip extension MMT to increase ease of stair negotiation. Status at last note/certification: 4-/5 B  Current 10/31/22 addressed with staggered stance bridges, incr reps of donkey kicks. 11/2/22: added prone hip ext   - Goal: Pt to report FOTO score of at least 57 pts to demonstrate improved function and quality of life.   Status at last note/certification: FOTO 37 pts     PLAN  [x]  Upgrade activities as tolerated     [x]  Continue plan of care  []  Update interventions per flow sheet       []  Discharge due to:_  [x]  Other:_  assess tolerance to activity progression, floor transfers    Dallas Mendoza, PT, DPT, CMPT 11/2/2022       Future Appointments   Date Time Provider Hao Gibson   11/2/2022  9:15 AM [facility] PT CINDY 2 [facility]

## 2022-11-03 ENCOUNTER — DOCUMENTATION ONLY (OUTPATIENT)
Dept: INTERNAL MEDICINE CLINIC | Age: 62
End: 2022-11-03

## 2022-11-07 ENCOUNTER — HOSPITAL ENCOUNTER (OUTPATIENT)
Dept: PHYSICAL THERAPY | Age: 62
Discharge: HOME | End: 2022-11-07
Payer: MEDICARE

## 2022-11-07 PROCEDURE — 97110 THERAPEUTIC EXERCISES: CPT

## 2022-11-07 PROCEDURE — 97530 THERAPEUTIC ACTIVITIES: CPT

## 2022-11-07 PROCEDURE — 97112 NEUROMUSCULAR REEDUCATION: CPT

## 2022-11-07 PROCEDURE — 97014 ELECTRIC STIMULATION THERAPY: CPT

## 2022-11-07 NOTE — PROGRESS NOTES
PT DAILY TREATMENT NOTE     Patient Name: Jovon Finder  Date:2022  : 1960  [x]  Patient  Verified  Payor: Maggie Jimenez / [facility] / Product Type: Managed Care Medicare /    In time:1:20  Out time:2:24  Total Treatment Time (min): 64  Visit #: 8 of 10    Medicare/BCBS Only   Total Timed Codes (min):  48 1:1 Treatment Time:  45       Treatment Area: Other low back pain [M54.59]    SUBJECTIVE  Pain Level (0-10 scale): 0  Any medication changes, allergies to medications, adverse drug reactions, diagnosis change, or new procedure performed?: [x] No    [] Yes (see summary sheet for update)  Subjective functional status/changes:   [] No changes reported  \"I have been feeling a little like my heart is racing all day, I think I am having more anxiety than usual.\"    OBJECTIVE    Modality rationale: decrease pain and increase tissue extensibility to improve the patients ability to perform ADLs and rest comfortably following therapy.     Min Type Additional Details   15 + 1 [x] Estim:  [x]Unatt       [x]IFC  []Premod                        []Other:  []w/ice   [x]w/heat  Position: prone  Location: low back    [] Estim: []Att    []TENS instruct  []NMES                    []Other:  []w/US   []w/ice   []w/heat  Position:   Location:     []  Traction: [] Cervical       []Lumbar                       [] Prone          []Supine                       []Intermittent   []Continuous Lbs:  [] before manual  [] after manual    []  Ultrasound: []Continuous   [] Pulsed                           []1MHz   []3MHz W/cm2:  Location:    []  Iontophoresis with dexamethasone         Location: [] Take home patch   [] In clinic    []  Ice     []  heat  []  Ice massage  []  Laser   []  Anodyne Position:   Location:     []  Laser with stim  []  Other:  Position:  Location:    []  Vasopneumatic Device    []  Right     []  Left  Pre-treatment girth:  Post-treatment girth:  Measured at (location): Pressure:       [] lo [] med [] hi   Temperature: [] lo [] med [] hi   [x] Skin assessment post-treatment:  [x]intact []redness- no adverse reaction    []redness - adverse reaction:     23 min Therapeutic Exercise:  [x] See flow sheet :   Rationale: increase ROM and increase strength to improve LE strength/mobility and  lumbar mobility to improve ease of ADLs and gait. 10 min Therapeutic Activity:  [x]  See flow sheet :   Rationale: increase strength, improve coordination, improve balance, and increase proprioception  to improve the patients ability to perform transfers, stair negotiation, functional lifts, and functional carries. Pt education: progress toward goals, squat technique, FOTO with supervision as needed     15 min Neuromuscular Re-education:  [x]  See flow sheet :   Rationale: increase strength, improve coordination, improve balance and increase proprioception  to improve the patients ability to perform functional tasks with improved abdominal brace utilization, improved control, and decreased risk for falls.           With   [x] TE   [x] TA   [] neuro   [] other: Patient Education: [x] Review HEP    [] Progressed/Changed HEP based on:   [] positioning   [x] body mechanics   [] transfers   [] heat/ice application    [] other:      Other Objective/Functional Measures:   Lumbar AROM:                                           AROM (% of full)                 Right Left Effect   Flexion 100    Extension 50 tension   Side Bend 1\" above knee 1\" above knee    Rotation 75 75                                               -  LE Strength:   Right (/5) Left (/5)   Hip     Flexion 5 5             Abduction 4+ 4+             Adduction               Extension 4 4-             ER 4+ 4+             IR 5 5   Knee   Extension 5 5              Flexion 5 5     Functional Squat: 30\" STS= 12 repetitions     Pain Level (0-10 scale) post treatment: 0    ASSESSMENT/Changes in Function: Patient has attended PT for 8 sessions for the treatment of low back pain. The patient demonstrates moderate progress at this time. She notes decreased pain and improved kinesthetic awareness as she has completed additional therapy sessions. Her lower back pain intensity has reduced substantially although it continues to become irritated with squatting and bed mobility. Additional assessment includes:  Subjective Gains: improved awareness of body, more \"in-tune\" with body, improved strength, improved outlook on her condition, better balance  Subjective Deficits: difficulty with bridges, difficulty with transfers from low cushioned surfaces, difficulty with prolonged standing (20-30 minutes max)  % improvement: 70%  Pain   Average: -3/10       Best: 0/10 (stiffness persists however)     Worst: 7/10  Patient Goal: \"continued improving strength\"        Patient will continue to benefit from skilled PT services to modify and progress therapeutic interventions, address functional mobility deficits, address ROM deficits, address strength deficits, analyze and address soft tissue restrictions, analyze and cue movement patterns, analyze and modify body mechanics/ergonomics, assess and modify postural abnormalities and address imbalance/dizziness to attain remaining goals. []  See Plan of Care  []  See progress note/recertification  []  See Discharge Summary         Progress towards goals / Updated goals:  Short Term Goals: To be accomplished in 1 week  - Goal: Pt to be compliant with initial HEP to improve ability to independently manage symptoms. Status at last note/certification: Established and reviewed with Pt  Current: pt reports 2x/daily compliance (10/13/22)  Long Term Goals: To be accomplished in 10 treatments  - Goal: Pt to demo B SLS of at least 10 seconds to improve ease with curb negotiation.   Status at last note/certification: 3\" B  Current: 10/24/22: on goin-5 seconds RLE, 8-10 seconds LLE , trendelenburg on RLE  - Goal: Pt to report < 4/10 pain at worst to increase ease with ADLs. Status at last note/certification: 84/20 pain at worst  Current: progressing, 7/10 pain at worst (11/7/22)  - Goal: Pt to demonstrate 5/5 B hip extension MMT to increase ease of stair negotiation. Status at last note/certification: 4-/5 B  Current 10/31/22 addressed with staggered stance bridges, incr reps of donkey kicks. 11/2/22: added prone hip ext   - Goal: Pt to report FOTO score of at least 57 pts to demonstrate improved function and quality of life.   Status at last note/certification: FOTO 37 pts   Current: progressing, FOTO 54 pts (11/7/22)    PLAN  [x]  Upgrade activities as tolerated     [x]  Continue plan of care  []  Update interventions per flow sheet       []  Discharge due to:_  [x]  Other:PN next sessions      Marti Levy 11/7/2022  7:44 AM    Future Appointments   Date Time Provider Hao Gibson   11/7/2022  1:15 PM Lore Milks [facility]   11/10/2022  2:00 PM Rosamaria Lanier, PT [facility]   11/14/2022 10:00 AM [facility] Mil[facility]   11/16/2022 10:00 AM Lore Milks [facility]   11/21/2022  9:15 AM Lore Milks [facility]   11/28/2022 10:00 AM Lore Milks [facility]   11/30/2022 10:00 AM Lore Milks [facility]

## 2022-11-10 ENCOUNTER — HOSPITAL ENCOUNTER (OUTPATIENT)
Dept: PHYSICAL THERAPY | Age: 62
Discharge: HOME | End: 2022-11-10
Payer: MEDICARE

## 2022-11-10 PROCEDURE — 97112 NEUROMUSCULAR REEDUCATION: CPT

## 2022-11-10 PROCEDURE — 97014 ELECTRIC STIMULATION THERAPY: CPT

## 2022-11-10 PROCEDURE — 97110 THERAPEUTIC EXERCISES: CPT

## 2022-11-10 PROCEDURE — 97530 THERAPEUTIC ACTIVITIES: CPT

## 2022-11-10 NOTE — PROGRESS NOTES
[facility] AT [address]  Phone: [phone] Fax: [phone]  PROGRESS NOTE  Patient Name: Shana Gutierrez : 1960   Treatment/Medical Diagnosis: Other low back pain [M54.59]   Referral Source: Lena Cantrell,*     Date of Initial Visit: 10/11/22 Attended Visits: 9 Missed Visits: 0     SUMMARY OF TREATMENT  Pt is a pleasant 57 y.o. female who presents with c/o low back pain and impaired gait/mobility. The patient reports an acute onset of low back pain following a fall directly onto her butt when lifting a heavy bed; she now describes lingering dull low back pain and lumbar spasms with prolonged standing, lifting, and certain twisting motions. Treatment has consisted of the following: Therapeutic exercise, Therapeutic activities, Neuromuscular re-education, Electrical stimulation, Gait/balance training, Manual therapy, Patient education, Functional mobility training, and Self Care training. CURRENT STATUS  Patient has attended PT for 9 sessions for the treatment of low back pain. The patient demonstrates moderate progress at this time. She notes decreased pain and improved kinesthetic awareness as she has completed additional therapy sessions. Her lower back pain intensity has reduced substantially although it continues to become irritated with squatting and bed mobility.  Additional assessment includes:    Subjective Gains: improved awareness of body, more \"in-tune\" with body, improved strength, improved outlook on her condition, better balance  Subjective Deficits: difficulty with bridges, difficulty with transfers from low cushioned surfaces, difficulty with prolonged standing (20-30 minutes max)  % improvement: 70%  Pain   Average: 2-310                     Best: 010 (stiffness persists however)                   Worst: 7/10  Patient Goal: \"continued improving strength\"      Right Left   Hamstring 90/90 - -   Ely + -                       Lumbar AROM:                                           AROM (% of full)                  Right Left Effect   Flexion 100     Extension 50 tension   Side Bend 1\" above knee 1\" above knee     Rotation 75 75                                                  LE Strength:    Right (/5) Left (/5)   Hip     Flexion 5 5             Abduction 4+ 4+             Adduction                 Extension 4 4-             ER 4+ 4+             IR 5 5   Knee   Extension 5 5              Flexion 5 5      Functional Squat: B Ant knee translation, improved with cues for hip hinge  30\" STS= 12 repetitions   SLS: R: 19 sec,  L: >30 sec                Progress towards goals / Updated goals:  Short Term Goals: To be accomplished in 1 week  - Goal: Pt to be compliant with initial HEP to improve ability to independently manage symptoms. Status at last note/certification: Established and reviewed with Pt  Current: goal met pt reports 2x/daily compliance (10/13/22)  Long Term Goals: To be accomplished in 10 treatments  - Goal: Pt to demo B SLS of at least 10 seconds to improve ease with curb negotiation. Status at last note/certification: 3\" B  Current: 11/10/22 goal met R: 19 sec,  L: >30 sec  - Goal: Pt to report < 4/10 pain at worst to increase ease with ADLs. Status at last note/certification: 60/66 pain at worst  Current: progressing, 7/10 pain at worst (11/7/22)  - Goal: Pt to demonstrate 5/5 B hip extension MMT to increase ease of stair negotiation. Status at last note/certification: 4-/5 B  Current 11/10/22 goal progressing, L: 4-/5, R: 4/5  - Goal: Pt to report FOTO score of at least 57 pts to demonstrate improved function and quality of life. Status at last note/certification: FOTO 37 pts   Current: progressing, FOTO 54 pts (11/7/22)    New Goals to be achieved in __4__  weeks:  - Goal: Pt to report < 4/10 pain at worst to increase ease with ADLs.   Status at last note/certification: 3/14 pain at worst (11/7/22)  - Goal: Pt to demonstrate 5/5 B hip extension MMT to increase ease of stair negotiation. Status at last note/certification:L: 4-/5, R: 4/5  - Goal: Pt to report FOTO score of at least 57 pts to demonstrate improved function and quality of life. Status at last note/certification: 54 pts   - Goal: Pt to demo R SLS of at least 30 seconds to improve ease with curb negotiation. Status at last note/certification: R: 19 sec    RECOMMENDATIONS  Recommend cont skilled PT at 1-2x/weekly for 4 weeks to address remaining deficits, achieve stated goals, and progress to PLOF. If you have any questions/comments please contact us directly at [phone]   Thank you for allowing us to assist in the care of your patient. Therapist Signature: Ana Luisa Madrid PT Date: 11/10/2022   Reporting Period  10/11/22 - 11/10/22 Time: 8:49 AM   NOTE TO PHYSICIAN:  PLEASE COMPLETE THE ORDERS BELOW AND FAX TO   [facility]: [phone]. If you are unable to process this request in 24 hours please contact our office: [phone].  ___ I have read the above report and request that my patient continue as recommended.   ___ I have read the above report and request that my patient continue therapy with the following changes/special instructions:_________________________________________________________   ___ I have read the above report and request that my patient be discharged from therapy.      Physician Signature:        Date:       Time:                                                        Patricia Cordero,*.

## 2022-11-10 NOTE — PROGRESS NOTES
PT DAILY TREATMENT NOTE     Patient Name: Miriam Saul  Date:11/10/2022  : 1960  [x]  Patient  Verified  Payor: Amina Garcia / [facility] / Product Type: Managed Care Medicare /    In time:   Out time: 72  Total Treatment Time (min): 64  Visit #: 9 of 10    Medicare/BCBS Only   Total Timed Codes (min):  47 1:1 Treatment Time:  47       Treatment Area: Other low back pain [M54.59]    SUBJECTIVE  Pain Level (0-10 scale): 4 to L/s L>R, and at spine  Any medication changes, allergies to medications, adverse drug reactions, diagnosis change, or new procedure performed?: [x] No    [] Yes (see summary sheet for update)  Subjective functional status/changes:   [] No changes reported  \"Clniton worked me really hard, I felt OK when I left the other day but 30 mins later I couldn't walk\". Pt notes using heat to help but did not do exercises. OBJECTIVE    Modality rationale: decrease pain and increase tissue extensibility to improve the patients ability to perform ADLs and rest comfortably following therapy.     Min Type Additional Details   15 + 2 [x] Estim:  [x]Unatt       [x]IFC  []Premod                        []Other:  []w/ice   [x]w/heat  Position: prone  Location: low back    [] Estim: []Att    []TENS instruct  []NMES                    []Other:  []w/US   []w/ice   []w/heat  Position:   Location:     []  Traction: [] Cervical       []Lumbar                       [] Prone          []Supine                       []Intermittent   []Continuous Lbs:  [] before manual  [] after manual    []  Ultrasound: []Continuous   [] Pulsed                           []1MHz   []3MHz W/cm2:  Location:    []  Iontophoresis with dexamethasone         Location: [] Take home patch   [] In clinic    []  Ice     []  heat  []  Ice massage  []  Laser   []  Anodyne Position:   Location:     []  Laser with stim  []  Other:  Position:  Location:    []  Vasopneumatic Device    []  Right     [] Left  Pre-treatment girth:  Post-treatment girth:  Measured at (location):  Pressure:       [] lo [] med [] hi   Temperature: [] lo [] med [] hi   [x] Skin assessment post-treatment:  [x]intact []redness- no adverse reaction    []redness - adverse reaction:     15 min Therapeutic Exercise:  [x] See flow sheet :  Reassessment   Rationale: increase ROM and increase strength to improve LE strength/mobility and  lumbar mobility to improve ease of ADLs and gait.    9 (NC) min Therapeutic Activity:  [x]  See flow sheet :  Progressed fire hydrant and donkey kick to standing with GTB  Added goblet squat, 10# KB (assess carry over of technique NV)    NV: deadlifts    Rationale: increase strength, improve coordination, improve balance, and increase proprioception  to improve the patients ability to perform transfers, stair negotiation, functional lifts, and functional carries. Pt education: progress toward goals, squat technique, FOTO with supervision as needed     15 min Neuromuscular Re-education:  [x]  See flow sheet :   Rationale: increase strength, improve coordination, improve balance and increase proprioception  to improve the patients ability to perform functional tasks with improved abdominal brace utilization, improved control, and decreased risk for falls. 8 min Manual Therapy:  Massage gun to L/s paraspinal MM, QL, B glutes   Rationale: decrease pain, increase ROM, and increase tissue extensibility to improve the patient's ability to perform ADLs. The manual therapy interventions were performed at a separate and distinct time from the therapeutic activities interventions.           throughout Patient Education: [x] Review HEP    Pt goals and intent to continue  Progress made in therapy     Other Objective/Functional Measures:       Right Left   Hamstring 90/90 - -   Ely + -                       Lumbar AROM:                                           AROM (% of full)                 Right Left Effect   Flexion 100    Extension 50 tension   Side Bend 1\" above knee 1\" above knee    Rotation 75 75                                                 LE Strength:   Right (/5) Left (/5)   Hip     Flexion 5 5             Abduction 4+ 4+             Adduction               Extension 4 4-             ER 4+ 4+             IR 5 5   Knee   Extension 5 5              Flexion 5 5     Functional Squat: B Ant knee translation, improved with cues for hip hinge  30\" STS= 12 repetitions   SLS: R: 19 sec,  L: >30 sec     Pain Level (0-10 scale) post treatment: 1    ASSESSMENT/Changes in Function: Patient has attended PT for 9 sessions for the treatment of low back pain. The patient demonstrates moderate progress at this time. She notes decreased pain and improved kinesthetic awareness as she has completed additional therapy sessions. Her lower back pain intensity has reduced substantially although it continues to become irritated with squatting and bed mobility. Additional assessment includes:  Subjective Gains: improved awareness of body, more \"in-tune\" with body, improved strength, improved outlook on her condition, better balance  Subjective Deficits: difficulty with bridges, difficulty with transfers from low cushioned surfaces, difficulty with prolonged standing (20-30 minutes max)  % improvement: 70%  Pain   Average: 2-3/10       Best: 0/10 (stiffness persists however)     Worst: 7/10  Patient Goal: \"continued improving strength\"    Patient will continue to benefit from skilled PT services to modify and progress therapeutic interventions, address functional mobility deficits, address ROM deficits, address strength deficits, analyze and address soft tissue restrictions, analyze and cue movement patterns, analyze and modify body mechanics/ergonomics, assess and modify postural abnormalities and address imbalance/dizziness to attain remaining goals.      []  See Plan of Care  [x]  See progress note/recertification  []  See Discharge Summary Progress towards goals / Updated goals:  Short Term Goals: To be accomplished in 1 week  - Goal: Pt to be compliant with initial HEP to improve ability to independently manage symptoms. Status at last note/certification: Established and reviewed with Pt  Current: goal met pt reports 2x/daily compliance (10/13/22)  Long Term Goals: To be accomplished in 10 treatments  - Goal: Pt to demo B SLS of at least 10 seconds to improve ease with curb negotiation. Status at last note/certification: 3\" B  Current: 11/10/22 goal met R: 19 sec,  L: >30 sec  - Goal: Pt to report < 4/10 pain at worst to increase ease with ADLs. Status at last note/certification: 13/41 pain at worst  Current: progressing, 7/10 pain at worst (11/7/22)  - Goal: Pt to demonstrate 5/5 B hip extension MMT to increase ease of stair negotiation. Status at last note/certification: 4-/5 B  Current 11/10/22 goal progressing, L: 4-/5, R: 4/5  - Goal: Pt to report FOTO score of at least 57 pts to demonstrate improved function and quality of life.   Status at last note/certification: FOTO 37 pts   Current: progressing, FOTO 54 pts (11/7/22)    PLAN  [x]  Upgrade activities as tolerated     [x]  Continue plan of care  []  Update interventions per flow sheet       []  Discharge due to:_  [x]  Other: Cont 1-2x/week for 4 weeks    Hazel Smalls, PT 11/10/2022  7:44 AM    Future Appointments   Date Time Provider Hao Gibson   11/10/2022  2:00 PM Eugenie Richmond, PT [facility]   11/14/2022 10:00 AM [facility]born [facility]   11/16/2022 10:00 AM Andra Osborn [facility]   11/21/2022  9:15 AM Andra Osborn [facility]   11/28/2022 10:00 AM [facility]   11/30/2022 10:00 AM Andra Osborn [facility]

## 2022-11-14 ENCOUNTER — HOSPITAL ENCOUNTER (OUTPATIENT)
Dept: PHYSICAL THERAPY | Age: 62
Discharge: HOME | End: 2022-11-14
Payer: MEDICARE

## 2022-11-14 PROCEDURE — 97530 THERAPEUTIC ACTIVITIES: CPT

## 2022-11-14 PROCEDURE — 97110 THERAPEUTIC EXERCISES: CPT

## 2022-11-14 PROCEDURE — 97014 ELECTRIC STIMULATION THERAPY: CPT

## 2022-11-14 PROCEDURE — 97112 NEUROMUSCULAR REEDUCATION: CPT

## 2022-11-14 NOTE — PROGRESS NOTES
PT DAILY TREATMENT NOTE     Patient Name: Rosa M Stacy  Date:2022  : 1960  [x]  Patient  Verified  Payor: Shelby Donovan / Plan: [employer] / Product Type: Managed Care Medicare /    In time:10:01  Out time:11:03  Total Treatment Time (min): 62  Visit #: 1 of 10    Medicare/BCBS Only   Total Timed Codes (min):  46 1:1 Treatment Time:  43       Treatment Area: Other low back pain [M54.59]    SUBJECTIVE  Pain Level (0-10 scale): 0  Any medication changes, allergies to medications, adverse drug reactions, diagnosis change, or new procedure performed?: [x] No    [] Yes (see summary sheet for update)  Subjective functional status/changes:   [] No changes reported  \"I have some morning stiffness today but no real pain to speak of. I noticed how much easier it was for me to get up the stairs this past weekend, that was encouraging\"    OBJECTIVE    Modality rationale: decrease pain and increase tissue extensibility to improve the patients ability to perform ADLs and rest comfortably following therapy.     Min Type Additional Details   15+1 [x] Estim:  [x]Unatt       [x]IFC  []Premod                        []Other:  []w/ice   [x]w/heat  Position: prone  Location: low back    [] Estim: []Att    []TENS instruct  []NMES                    []Other:  []w/US   []w/ice   []w/heat  Position:   Location:     []  Traction: [] Cervical       []Lumbar                       [] Prone          []Supine                       []Intermittent   []Continuous Lbs:  [] before manual  [] after manual    []  Ultrasound: []Continuous   [] Pulsed                           []1MHz   []3MHz W/cm2:  Location:    []  Iontophoresis with dexamethasone         Location: [] Take home patch   [] In clinic    []  Ice     []  heat  []  Ice massage  []  Laser   []  Anodyne Position:   Location:     []  Laser with stim  []  Other:  Position:  Location:    []  Vasopneumatic Device    []  Right     []  Left  Pre-treatment girth:  Post-treatment girth:  Measured at (location):  Pressure:       [] lo [] med [] hi   Temperature: [] lo [] med [] hi   [x] Skin assessment post-treatment:  [x]intact []redness- no adverse reaction    []redness - adverse reaction:     10 min Therapeutic Exercise:  [x] See flow sheet :   Rationale: increase ROM and increase strength to improve LE strength/mobility and  lumbar mobility to improve ease of ADLs and gait. 15 min Therapeutic Activity:  [x]  See flow sheet :   Rationale: increase strength, improve coordination, improve balance, and increase proprioception  to improve the patients ability to perform transfers, stair negotiation, functional lifts, and functional carries. Pt education: squat technique     21 min Neuromuscular Re-education:  [x]  See flow sheet :   Rationale: increase strength, improve coordination, improve balance and increase proprioception  to improve the patients ability to perform functional tasks with improved abdominal brace utilization, improved control, and decreased risk for falls. With   [x] TE   [] TA   [x] neuro   [] other: Patient Education: [x] Review HEP    [] Progressed/Changed HEP based on:   [] positioning   [] body mechanics   [] transfers   [] heat/ice application    [] other:      Other Objective/Functional Measures: -Added PNF lift with KB, power carry, uni shoulder press with HR     Pain Level (0-10 scale) post treatment: 0    ASSESSMENT/Changes in Function: Focus of today's session on introducing increased difficulty of standing interventions to challenged core/hip stability with functional lifts/carries. Patient requires skilled verbal/visual cues for correct technique throughout newly introduced interventions and to incorporate appropriate exhalation into squat pattern to reduce use of valsalva hold.      Patient will continue to benefit from skilled PT services to modify and progress therapeutic interventions, address functional mobility deficits, address ROM deficits, address strength deficits, analyze and address soft tissue restrictions, analyze and cue movement patterns, analyze and modify body mechanics/ergonomics, assess and modify postural abnormalities and address imbalance/dizziness to attain remaining goals. []  See Plan of Care  []  See progress note/recertification  []  See Discharge Summary         Progress towards goals / Updated goals:  - Goal: Pt to report < 4/10 pain at worst to increase ease with ADLs. Status at last note/certification: 8/86 pain at worst   - Goal: Pt to demonstrate 5/5 B hip extension MMT to increase ease of stair negotiation. Status at last note/certification:L: 4-/5, R: 4/5  - Goal: Pt to report FOTO score of at least 57 pts to demonstrate improved function and quality of life. Status at last note/certification: 54 pts   - Goal: Pt to demo R SLS of at least 30 seconds to improve ease with curb negotiation.   Status at last note/certification: R: 19 sec    PLAN  [x]  Upgrade activities as tolerated     [x]  Continue plan of care  []  Update interventions per flow sheet       []  Discharge due to:_  []  Other:_      Meera Condon 11/14/2022  7:40 AM    Future Appointments   Date Time Provider Hao Gibson   11/14/2022 10:00 AM Davion Morelle [facility]   11/16/2022 10:00 AM Davion Morelle [facility]   11/21/2022  9:15 AM Davion Morelle [facility]   11/28/2022 10:00 AM Davion Morelle [facility]   11/30/2022 10:00 AM Davion Morelle [facility]   12/5/2022 10:45 AM Davion Morelle [facility]   12/8/2022 11:30 AM Davion Morelle [facility]

## 2022-11-16 ENCOUNTER — HOSPITAL ENCOUNTER (OUTPATIENT)
Dept: PHYSICAL THERAPY | Age: 62
Discharge: HOME | End: 2022-11-16
Payer: MEDICARE

## 2022-11-16 PROCEDURE — 97530 THERAPEUTIC ACTIVITIES: CPT

## 2022-11-16 PROCEDURE — 97110 THERAPEUTIC EXERCISES: CPT

## 2022-11-16 PROCEDURE — 97014 ELECTRIC STIMULATION THERAPY: CPT

## 2022-11-16 PROCEDURE — 97112 NEUROMUSCULAR REEDUCATION: CPT

## 2022-11-16 NOTE — PROGRESS NOTES
PT DAILY TREATMENT NOTE     Patient Name: Sacha Roca  Date:2022  : 1960  [x]  Patient  Verified  Payor: Yuridia Margie / Plan: [employer] / Product Type: Managed Care Medicare /    In time:10:02  Out time:11:05  Total Treatment Time (min): 63  Visit #: 2 of 10    Medicare/BCBS Only   Total Timed Codes (min):  47 1:1 Treatment Time:  41       Treatment Area: Other low back pain [M54.59]    SUBJECTIVE  Pain Level (0-10 scale): 0   Any medication changes, allergies to medications, adverse drug reactions, diagnosis change, or new procedure performed?: [x] No    [] Yes (see summary sheet for update)  Subjective functional status/changes:   [] No changes reported  \"I felt surprisingly great after last session\"    OBJECTIVE    Modality rationale: decrease pain and increase tissue extensibility to improve the patients ability to perform ADLs and rest comfortably following therapy.     Min Type Additional Details   15+1 [x] Estim:  [x]Unatt       [x]IFC  []Premod                        []Other:  []w/ice   [x]w/heat  Position: prone  Location: low back    [] Estim: []Att    []TENS instruct  []NMES                    []Other:  []w/US   []w/ice   []w/heat  Position:   Location:     []  Traction: [] Cervical       []Lumbar                       [] Prone          []Supine                       []Intermittent   []Continuous Lbs:  [] before manual  [] after manual    []  Ultrasound: []Continuous   [] Pulsed                           []1MHz   []3MHz W/cm2:  Location:    []  Iontophoresis with dexamethasone         Location: [] Take home patch   [] In clinic    []  Ice     []  heat  []  Ice massage  []  Laser   []  Anodyne Position:   Location:     []  Laser with stim  []  Other:  Position:  Location:    []  Vasopneumatic Device    []  Right     []  Left  Pre-treatment girth:  Post-treatment girth:  Measured at (location):  Pressure:       [] lo [] med [] hi   Temperature: [] lo [] med [] hi   [x] Skin assessment post-treatment:  [x]intact []redness- no adverse reaction    []redness - adverse reaction:     14 min Therapeutic Exercise:  [x] See flow sheet :   Rationale: increase ROM and increase strength to improve LE strength/mobility and  lumbar mobility to improve ease of ADLs and gait. 16 min Therapeutic Activity:  [x]  See flow sheet :   Rationale: increase strength, improve coordination, improve balance, and increase proprioception  to improve the patients ability to perform transfers, stair negotiation, functional lifts, and functional carries. Pt education: squat/dead lift technique     17 min Neuromuscular Re-education:  [x]  See flow sheet :   Rationale: increase strength, improve coordination, improve balance and increase proprioception  to improve the patients ability to perform functional tasks with improved abdominal brace utilization, improved control, and decreased risk for falls. With   [] TE   [] TA   [] neuro   [] other: Patient Education: [x] Review HEP    [] Progressed/Changed HEP based on:   [] positioning   [] body mechanics   [] transfers   [] heat/ice application    [] other:      Other Objective/Functional Measures: -Added KB deadlifts, PNF lifts/chops with Leydi resistance     Pain Level (0-10 scale) post treatment: 0    ASSESSMENT/Changes in Function: Focus of today's treatment on progressing standing dead lifts and PNF lifts/chops with cable resistance. The patient requires intermittent verbal cuing to promote upright posture and posterior hip hinge through movement. Overall, the patient is making progress towards therapy goals of improved standing tolerance and improved hip strength.       Patient will continue to benefit from skilled PT services to modify and progress therapeutic interventions, address functional mobility deficits, address ROM deficits, address strength deficits, analyze and address soft tissue restrictions, analyze and cue movement patterns, analyze and modify body mechanics/ergonomics, assess and modify postural abnormalities and address imbalance/dizziness to attain remaining goals. []  See Plan of Care  []  See progress note/recertification  []  See Discharge Summary         Progress towards goals / Updated goals:  - Goal: Pt to report < 4/10 pain at worst to increase ease with ADLs. Status at last note/certification: 9/80 pain at worst   - Goal: Pt to demonstrate 5/5 B hip extension MMT to increase ease of stair negotiation. Status at last note/certification:L: 4-/5, R: 4/5  Current: addressing with  deadlifts (11/16/22)  - Goal: Pt to report FOTO score of at least 57 pts to demonstrate improved function and quality of life. Status at last note/certification: 54 pts   - Goal: Pt to demo R SLS of at least 30 seconds to improve ease with curb negotiation.   Status at last note/certification: R: 19 sec    PLAN  [x]  Upgrade activities as tolerated     [x]  Continue plan of care  []  Update interventions per flow sheet       []  Discharge due to:_  []  Other:_      Lara Meyer 11/16/2022  9:09 AM    Future Appointments   Date Time Provider Hao Gibson   11/16/2022 10:00 AM Andra Osborn [facility]   11/21/2022  9:15 AM Andra Osborn [facility]   11/28/2022 10:00 AM Andra Osborn [facility]   11/30/2022 10:00 AM Andra Osborn [facility]   12/5/2022 10:45 AM Andra Osborn [facility]   12/8/2022 11:30 AM Andra Osborn [facility]

## 2022-11-21 ENCOUNTER — APPOINTMENT (OUTPATIENT)
Dept: PHYSICAL THERAPY | Age: 62
End: 2022-11-21
Payer: MEDICARE

## 2022-11-28 ENCOUNTER — HOSPITAL ENCOUNTER (OUTPATIENT)
Dept: PHYSICAL THERAPY | Age: 62
Discharge: HOME | End: 2022-11-28
Payer: MEDICARE

## 2022-11-28 PROCEDURE — 97530 THERAPEUTIC ACTIVITIES: CPT

## 2022-11-28 PROCEDURE — 97014 ELECTRIC STIMULATION THERAPY: CPT

## 2022-11-28 PROCEDURE — 97112 NEUROMUSCULAR REEDUCATION: CPT

## 2022-11-28 PROCEDURE — 97110 THERAPEUTIC EXERCISES: CPT

## 2022-11-28 NOTE — PROGRESS NOTES
PT DAILY TREATMENT NOTE     Patient Name: Moses Greene  Date:2022  : 1960  [x]  Patient  Verified  Payor: Lowell Boucher / Plan: [employer] / Product Type: Managed Care Medicare /    In time:9:54  Out time:11:04  Total Treatment Time (min): 70  Visit #: 3 of 10    Medicare/BCBS Only   Total Timed Codes (min):  55 1:1 Treatment Time:  55       Treatment Area: Other low back pain [M54.59]    SUBJECTIVE  Pain Level (0-10 scale): 0  Any medication changes, allergies to medications, adverse drug reactions, diagnosis change, or new procedure performed?: [x] No    [] Yes (see summary sheet for update)  Subjective functional status/changes:   [] No changes reported  \"No pain today but I am feeling stiff. \"    OBJECTIVE    Modality rationale: decrease pain and increase tissue extensibility to improve the patients ability to perform ADLs and rest comfortably following therapy.     Min Type Additional Details   15 [x] Estim:  [x]Unatt       [x]IFC  []Premod                        []Other:  []w/ice   [x]w/heat  Position: prone  Location: low back    [] Estim: []Att    []TENS instruct  []NMES                    []Other:  []w/US   []w/ice   []w/heat  Position:   Location:     []  Traction: [] Cervical       []Lumbar                       [] Prone          []Supine                       []Intermittent   []Continuous Lbs:  [] before manual  [] after manual    []  Ultrasound: []Continuous   [] Pulsed                           []1MHz   []3MHz W/cm2:  Location:    []  Iontophoresis with dexamethasone         Location: [] Take home patch   [] In clinic    []  Ice     []  heat  []  Ice massage  []  Laser   []  Anodyne Position:   Location:     []  Laser with stim  []  Other:  Position:  Location:    []  Vasopneumatic Device    []  Right     []  Left  Pre-treatment girth:  Post-treatment girth:  Measured at (location):  Pressure:       [] lo [] med [] hi   Temperature: [] lo [] med [] hi   [x] Skin assessment post-treatment:  [x]intact []redness- no adverse reaction    []redness - adverse reaction:     15 min Therapeutic Exercise:  [x] See flow sheet :   Rationale: increase ROM and increase strength to improve LE strength/mobility and  lumbar mobility to improve ease of ADLs and gait. 25 min Therapeutic Activity:  [x]  See flow sheet :   Rationale: increase strength, improve coordination, improve balance, and increase proprioception  to improve the patients ability to perform transfers, stair negotiation, functional lifts, and functional carries. Pt education: squat/dead lift technique     15 min Neuromuscular Re-education:  [x]  See flow sheet :   Rationale: increase strength, improve coordination, improve balance and increase proprioception  to improve the patients ability to perform functional tasks with improved abdominal brace utilization, improved control, and decreased risk for falls. With   [x] TE   [x] TA   [] neuro   [] other: Patient Education: [x] Review HEP    [] Progressed/Changed HEP based on:   [] positioning   [] body mechanics   [] transfers   [] heat/ice application    [] other:      Other Objective/Functional Measures: -Added steam boats (hip x 3 with rapid pertubations) and stand march with single UE extension     Pain Level (0-10 scale) post treatment: 0    ASSESSMENT/Changes in Function: Verbal/visual cuing provided today to encourage coordinated trunk rotation during PNF lifts/chops to promote lumbar stability. Skilled cues provided during dead lifts to encourage scapular retraction to promote upright posture. Overall pt continues to demo improved functional LE strength during functional lifts/carries. Discussed possible transition to independent program at local gym upon discharge to continue therapy gains.     Patient will continue to benefit from skilled PT services to modify and progress therapeutic interventions, address functional mobility deficits, address ROM deficits, address strength deficits, analyze and address soft tissue restrictions, analyze and cue movement patterns, analyze and modify body mechanics/ergonomics, assess and modify postural abnormalities and address imbalance/dizziness to attain remaining goals. []  See Plan of Care  []  See progress note/recertification  []  See Discharge Summary         Progress towards goals / Updated goals:  - Goal: Pt to report < 4/10 pain at worst to increase ease with ADLs. Status at last note/certification: 8/44 pain at worst   Current: pt reports less pain at this time, more stiffness in low back (11/28/22)  - Goal: Pt to demonstrate 5/5 B hip extension MMT to increase ease of stair negotiation. Status at last note/certification:L: 4-/5, R: 4/5  Current: addressing with KB deadlifts (11/16/22)  - Goal: Pt to report FOTO score of at least 57 pts to demonstrate improved function and quality of life. Status at last note/certification: 54 pts   Current:   - Goal: Pt to demo R SLS of at least 30 seconds to improve ease with curb negotiation.   Status at last note/certification: R: 19 sec  Current:     PLAN  [x]  Upgrade activities as tolerated     [x]  Continue plan of care  []  Update interventions per flow sheet       []  Discharge due to:_  []  Other:_      Martha Moore 11/28/2022  7:40 AM    Future Appointments   Date Time Provider Hao Gibson   11/28/2022 10:00 AM Viviana Fuentes [facility]   11/30/2022 10:00 AM [facility] [place] [facility]   12/5/2022 10:45 AM Viviana Fuentes [facility]   12/8/2022 11:30 AM Viviana Fuentes [facility]

## 2022-11-30 ENCOUNTER — HOSPITAL ENCOUNTER (OUTPATIENT)
Dept: PHYSICAL THERAPY | Age: 62
Discharge: HOME | End: 2022-11-30
Payer: MEDICARE

## 2022-11-30 PROCEDURE — 97530 THERAPEUTIC ACTIVITIES: CPT | Performed by: PHYSICAL THERAPIST

## 2022-11-30 PROCEDURE — 97112 NEUROMUSCULAR REEDUCATION: CPT | Performed by: PHYSICAL THERAPIST

## 2022-11-30 PROCEDURE — 97110 THERAPEUTIC EXERCISES: CPT | Performed by: PHYSICAL THERAPIST

## 2022-11-30 NOTE — PROGRESS NOTES
PT DAILY TREATMENT NOTE     Patient Name: Joann Dyer  HKDN:  : 1960  [x]  Patient  Verified  Payor: Milady Sham / Plan: [facility] [employer] / Product Type: Managed Care Medicare /    In time: 10:05am   Out time:1059am  Total Treatment Time (min): 54  Visit #: 4 of 10    Medicare/BCBS Only   Total Timed Codes (min):  54 1:1 Treatment Time 49       Treatment Area: Other low back pain [M54.59]    SUBJECTIVE  Pain Level (0-10 scale): 0  Any medication changes, allergies to medications, adverse drug reactions, diagnosis change, or new procedure performed?: [x] No    [] Yes (see summary sheet for update)  Subjective functional status/changes:   [] No changes reported  \" I got a referral to a cardiologist due to HTN recently \"    OBJECTIVE    Modality rationale: decrease pain and increase tissue extensibility to improve the patients ability to perform ADLs and rest comfortably following therapy.     Min Type Additional Details   TC/PD [x] Estim:  [x]Unatt       [x]IFC  []Premod                        []Other:  []w/ice   [x]w/heat  Position: prone  Location: low back    [] Estim: []Att    []TENS instruct  []NMES                    []Other:  []w/US   []w/ice   []w/heat  Position:   Location:     []  Traction: [] Cervical       []Lumbar                       [] Prone          []Supine                       []Intermittent   []Continuous Lbs:  [] before manual  [] after manual    []  Ultrasound: []Continuous   [] Pulsed                           []1MHz   []3MHz W/cm2:  Location:    []  Iontophoresis with dexamethasone         Location: [] Take home patch   [] In clinic    []  Ice     []  heat  []  Ice massage  []  Laser   []  Anodyne Position:   Location:     []  Laser with stim  []  Other:  Position:  Location:    []  Vasopneumatic Device    []  Right     []  Left  Pre-treatment girth:  Post-treatment girth:  Measured at (location):  Pressure:       [] lo [] med [] hi   Temperature: [] lo [] med [] hi   [x] Skin assessment post-treatment:  [x]intact []redness- no adverse reaction    []redness - adverse reaction:     15 min Therapeutic Exercise:  [x] See flow sheet :   Rationale: increase ROM and increase strength to improve LE strength/mobility and  lumbar mobility to improve ease of ADLs and gait. 24/19 min Therapeutic Activity:  [x]  See flow sheet :   Rationale: increase strength, improve coordination, improve balance, and increase proprioception  to improve the patients ability to perform transfers, stair negotiation, functional lifts, and functional carries. Pt education: squat/dead lift technique     15 min Neuromuscular Re-education:  [x]  See flow sheet :   Rationale: increase strength, improve coordination, improve balance and increase proprioception  to improve the patients ability to perform functional tasks with improved abdominal brace utilization, improved control, and decreased risk for falls. With   [x] TE   [x] TA   [] neuro   [] other: Patient Education: [x] Review HEP    [] Progressed/Changed HEP based on:   [] positioning   [] body mechanics   [] transfers   [] heat/ice application    [] other:      Other Objective/Functional Measures:   R SLS: ~10 sec Reagan  Increased to 7# for 90/90 farmers carry  Pt requires UE A for donkey kicks and clams in half standing    Pain Level (0-10 scale) post treatment: 0    ASSESSMENT/Changes in Function:   Pt reports main LBP is central, avg 1-2 when it occurs. Overall pt continues to demo improved functional LE strength. Pt was under TC for E-stim and MHP. PT discussed with patient starting to wean off e-stim as she will be transitioning to a community based program  soon. Pt reports increased fatigue today post therex.      Patient will continue to benefit from skilled PT services to modify and progress therapeutic interventions, address functional mobility deficits, address ROM deficits, address strength deficits, analyze and address soft tissue restrictions, analyze and cue movement patterns, analyze and modify body mechanics/ergonomics, assess and modify postural abnormalities and address imbalance/dizziness to attain remaining goals. []  See Plan of Care  []  See progress note/recertification  []  See Discharge Summary         Progress towards goals / Updated goals:  - Goal: Pt to report < 4/10 pain at worst to increase ease with ADLs. Status at last note/certification: 9/74 pain at worst   Current: pt reports less pain at this time, more stiffness in low back (11/28/22)  - Goal: Pt to demonstrate 5/5 B hip extension MMT to increase ease of stair negotiation. Status at last note/certification:L: 4-/5, R: 4/5  Current: addressing with KB deadlifts (11/16/22)  - Goal: Pt to report FOTO score of at least 57 pts to demonstrate improved function and quality of life. Status at last note/certification: 54 pts   Current:   - Goal: Pt to demo R SLS of at least 30 seconds to improve ease with curb negotiation.   Status at last note/certification: R: 19 sec  Current: ~10 sec 11/30/22    PLAN  [x]  Upgrade activities as tolerated     [x]  Continue plan of care  []  Update interventions per flow sheet       []  Discharge due to:_  []  Other:_      Sujey Score, PT 11/30/2022  7:40 AM    Future Appointments   Date Time Provider Hao Gibson   11/30/2022 10:00 AM [address] [place] [address] [facility]   12/5/2022 10:45 AM Dwayne Singleton [facility]   12/8/2022 11:30 AM Rice Grzina [facility]

## 2022-12-05 ENCOUNTER — HOSPITAL ENCOUNTER (OUTPATIENT)
Dept: PHYSICAL THERAPY | Age: 62
Discharge: HOME | End: 2022-12-05
Payer: MEDICARE

## 2022-12-05 PROCEDURE — 97530 THERAPEUTIC ACTIVITIES: CPT

## 2022-12-05 PROCEDURE — 97112 NEUROMUSCULAR REEDUCATION: CPT

## 2022-12-05 PROCEDURE — 97110 THERAPEUTIC EXERCISES: CPT

## 2022-12-05 NOTE — PROGRESS NOTES
PT DAILY TREATMENT NOTE     Patient Name: Magnus Ewing  Date:2022  : 1960  [x]  Patient  Verified  Payor: Kera Lucero / Plan: [email or web address] / Product Type: Managed Care Medicare /    In time:10:50  Out time:11:45  Total Treatment Time (min): 55  Visit #: 5 of 10    Medicare/BCBS Only   Total Timed Codes (min):  45 1:1 Treatment Time:  42       Treatment Area: Other low back pain [M54.59]    SUBJECTIVE  Pain Level (0-10 scale): 2-3  Any medication changes, allergies to medications, adverse drug reactions, diagnosis change, or new procedure performed?: [x] No    [] Yes (see summary sheet for update)  Subjective functional status/changes:   [] No changes reported  \"I did a lot of walking without resting afterwards. That was pretty difficult and my hip hurt more afterwards\"    OBJECTIVE    Modality rationale: decrease pain and increase tissue extensibility to improve the patients ability to perform ADLs and rest comfortably following therapy.     Min Type Additional Details    [] Estim:  []Unatt       []IFC  []Premod                        []Other:  []w/ice   []w/heat  Position:   Location:     [] Estim: []Att    []TENS instruct  []NMES                    []Other:  []w/US   []w/ice   []w/heat  Position:   Location:     []  Traction: [] Cervical       []Lumbar                       [] Prone          []Supine                       []Intermittent   []Continuous Lbs:  [] before manual  [] after manual    []  Ultrasound: []Continuous   [] Pulsed                           []1MHz   []3MHz W/cm2:  Location:    []  Iontophoresis with dexamethasone         Location: [] Take home patch   [] In clinic   10 []  Ice     [x]  heat  []  Ice massage  []  Laser   []  Anodyne Position: supine w/ wedge  Location: low back    []  Laser with stim  []  Other:  Position:  Location:    []  Vasopneumatic Device    []  Right     []  Left  Pre-treatment girth:  Post-treatment girth:  Measured at (location):  Pressure:       [] lo [] med [] hi   Temperature: [] lo [] med [] hi   [x] Skin assessment post-treatment:  [x]intact []redness- no adverse reaction    []redness - adverse reaction:     15 min Therapeutic Exercise:  [x] See flow sheet :   Rationale: increase ROM and increase strength to improve LE strength/mobility and  lumbar mobility to improve ease of ADLs and gait. 20 min Therapeutic Activity:  [x]  See flow sheet :   Rationale: increase strength, improve coordination, improve balance, and increase proprioception  to improve the patients ability to perform transfers, stair negotiation, functional lifts, and functional carries. Pt education: motivation levels impact on exercise     10 min Neuromuscular Re-education:  [x]  See flow sheet :   Rationale: increase strength, improve coordination, improve balance and increase proprioception  to improve the patients ability to perform functional tasks with improved abdominal brace utilization, improved control, and decreased risk for falls. With   [] TE   [] TA   [] neuro   [] other: Patient Education: [x] Review HEP    [] Progressed/Changed HEP based on:   [] positioning   [] body mechanics   [] transfers   [] heat/ice application    [] other:      Other Objective/Functional Measures: -Encourage knee bend into dead lift     Pain Level (0-10 scale) post treatment: 0    ASSESSMENT/Changes in Function: Patient education provided today encourage posterior hip hinge when performing functional goblet squat and dead lift. Pt encouraged to participate in interventions at her best effort for the day, not necessarily focusing on maximizing weight/repetitions but maximizing effort. Patient to continue for additional month of therapy to maximize LE strengthening and update HEP in advance of discharge.     Patient will continue to benefit from skilled PT services to modify and progress therapeutic interventions, address functional mobility deficits, address ROM deficits, address strength deficits, analyze and address soft tissue restrictions, analyze and cue movement patterns, analyze and modify body mechanics/ergonomics, assess and modify postural abnormalities and address imbalance/dizziness to attain remaining goals. []  See Plan of Care  []  See progress note/recertification  []  See Discharge Summary         Progress towards goals / Updated goals:  - Goal: Pt to report < 4/10 pain at worst to increase ease with ADLs. Status at last note/certification: 4/23 pain at worst   Current: pt reports less pain at this time, more stiffness in low back (11/28/22)  - Goal: Pt to demonstrate 5/5 B hip extension MMT to increase ease of stair negotiation. Status at last note/certification:L: 4-/5, R: 4/5  Current: addressing with KB deadlifts (11/16/22)  - Goal: Pt to report FOTO score of at least 57 pts to demonstrate improved function and quality of life. Status at last note/certification: 54 pts   Current:   - Goal: Pt to demo R SLS of at least 30 seconds to improve ease with curb negotiation.   Status at last note/certification: R: 19 sec  Current: ~10 sec 11/30/22    PLAN  [x]  Upgrade activities as tolerated     [x]  Continue plan of care  []  Update interventions per flow sheet       []  Discharge due to:_  []  Other:_      Alexander Pavon 12/5/2022  7:46 AM    Future Appointments   Date Time Provider Hao Gibson   12/5/2022 10:45 AM Truong [facility]   12/8/2022 11:30 AM Turong MONTGOMERY [facility]

## 2022-12-08 ENCOUNTER — HOSPITAL ENCOUNTER (OUTPATIENT)
Dept: PHYSICAL THERAPY | Age: 62
Discharge: HOME | End: 2022-12-08
Payer: MEDICARE

## 2022-12-08 PROCEDURE — 97112 NEUROMUSCULAR REEDUCATION: CPT

## 2022-12-08 PROCEDURE — 97530 THERAPEUTIC ACTIVITIES: CPT

## 2022-12-08 PROCEDURE — 97110 THERAPEUTIC EXERCISES: CPT

## 2022-12-08 NOTE — PROGRESS NOTES
PT DAILY TREATMENT NOTE     Patient Name: Sakshi Rodriguez  FCFJ:  : 1960  [x]  Patient  Verified  Payor: Jaydon Burn / [facility] / Product Type: Managed Care Medicare /    In time:11:33  Out time:12:31  Total Treatment Time (min): 58  Visit #: 6 of 10    Medicare/BCBS Only   Total Timed Codes (min):  47 1:1 Treatment Time:  40       Treatment Area: Other low back pain [M54.59]    SUBJECTIVE  Pain Level (0-10 scale): 2  Any medication changes, allergies to medications, adverse drug reactions, diagnosis change, or new procedure performed?: [x] No    [] Yes (see summary sheet for update)  Subjective functional status/changes:   [] No changes reported  \"I feel like I still get that pulling in my left hip\"    OBJECTIVE    Modality rationale: decrease pain and increase tissue extensibility to improve the patients ability to perform ADLs and rest comfortably following therapy.     Min Type Additional Details    [] Estim:  []Unatt       []IFC  []Premod                        []Other:  []w/ice   []w/heat  Position:   Location:     [] Estim: []Att    []TENS instruct  []NMES                    []Other:  []w/US   []w/ice   []w/heat  Position:   Location:     []  Traction: [] Cervical       []Lumbar                       [] Prone          []Supine                       []Intermittent   []Continuous Lbs:  [] before manual  [] after manual    []  Ultrasound: []Continuous   [] Pulsed                           []1MHz   []3MHz W/cm2:  Location:    []  Iontophoresis with dexamethasone         Location: [] Take home patch   [] In clinic   10 []  Ice     [x]  heat  []  Ice massage  []  Laser   []  Anodyne Position: supine  Location: low back    []  Laser with stim  []  Other:  Position:  Location:    []  Vasopneumatic Device    []  Right     []  Left  Pre-treatment girth:  Post-treatment girth:  Measured at (location):  Pressure:       [] lo [] med [] hi   Temperature: [] lo [] med [] hi   [x] Skin assessment post-treatment:  [x]intact []redness- no adverse reaction    []redness - adverse reaction:     18 min Therapeutic Exercise:  [x] See flow sheet :   Rationale: increase ROM and increase strength to improve LE strength/mobility and  lumbar mobility to improve ease of ADLs and gait. 18 min Therapeutic Activity:  [x]  See flow sheet :   Rationale: increase strength, improve coordination, improve balance, and increase proprioception  to improve the patients ability to perform transfers, stair negotiation, functional lifts, and functional carries. Pt education: reassessment, 2MWT, progress toward goals, updated HEP     11 min Neuromuscular Re-education:  [x]  See flow sheet :   Rationale: increase strength, improve coordination, improve balance and increase proprioception  to improve the patients ability to perform functional tasks with improved abdominal brace utilization, improved control, and decreased risk for falls.         With   [] TE   [x] TA   [] neuro   [] other: Patient Education: [x] Review HEP    [] Progressed/Changed HEP based on:   [] positioning   [] body mechanics   [] transfers   [] heat/ice application    [] other:      Other Objective/Functional Measures:   Lumbar AROM:                                           AROM (% of full)                  Right Left Effect   Flexion 100     Extension 50 tension   Side Bend 1\" above knee 1\" above knee     Rotation 75 75                                                  LE Strength:    Right (/5) Left (/5)   Hip     Flexion 5 5             Abduction 5 5             Adduction                 Extension 4 4             ER 4+ 4+             IR 5 5   Knee   Extension 5 5              Flexion 5 5      Functional Squat: B Ant knee translation, improved with cues for hip hinge  30\" STS= 15 repetitions   Gait:  2MWT= 449' RPE 2-3/10  Goals 473'   SLS: R: 18 sec,  L: >30 sec, B Trendelenberg sign noted B    Pain Level (0-10 scale) post treatment: 0    ASSESSMENT/Changes in Function:   Patient has attended PT for 15 sessions for the treatment of low back pain. The patient demonstrates excellent progress at this time. She reports improved hip strength/endurance, improved ease with squatting, improved lumbar mobility, and decreased pain with daily activities. She demos improved squat tolerance with 30\" STS test and improving technique with floor<>waist lifts and lifting overhead. She cont to remain challenged with prolonged ambulation and her balance is impacted by continued hip deficits in closed chain. Additional assessment includes:  Subjective Gains: all movement is easier, decreased low back pain (more discomfort than true pain at this time), improved ease getting in/out of car, increased ease lifting items from floor  Subjective Deficits: difficulty with lifting increased weight from floor, difficulty with prolonged ambulation  % improvement: 80%  Pain   Average: 1-2/10       Best: 0/10     Worst: 4-5/10  Patient Goal: \"improve balance and strength, develop more consistent form\"      Patient will continue to benefit from skilled PT services to modify and progress therapeutic interventions, address functional mobility deficits, address ROM deficits, address strength deficits, analyze and address soft tissue restrictions, analyze and cue movement patterns, analyze and modify body mechanics/ergonomics, assess and modify postural abnormalities and address imbalance/dizziness to attain remaining goals. []  See Plan of Care  []  See progress note/recertification  []  See Discharge Summary         Progress towards goals / Updated goals:  - Goal: Pt to report < 4/10 pain at worst to increase ease with ADLs. Status at last note/certification: 4/73 pain at worst   Current: progressing, 4-5/10 pain at worst (12/8/22)  - Goal: Pt to demonstrate 5/5 B hip extension MMT to increase ease of stair negotiation.   Status at last note/certification:L: 4-/5, R: 4/5  Current: progressing, 4/5 B (12/8/22)  - Goal: Pt to report FOTO score of at least 57 pts to demonstrate improved function and quality of life. Status at last note/certification: 54 pts   Current: progressing, FOTO 56 pts (12/8/22)  - Goal: Pt to demo R SLS of at least 30 seconds to improve ease with curb negotiation.   Status at last note/certification: R: 19 sec  Current: not met, remains 18\" on average (12/8/22)    PLAN  [x]  Upgrade activities as tolerated     [x]  Continue plan of care  []  Update interventions per flow sheet       []  Discharge due to:_  []  Other:_      Afia Larsen 12/8/2022  7:41 AM    Future Appointments   Date Time Provider Hao Gibson   12/8/2022 11:30 AM Dashawn Villanueva [facility]

## 2022-12-08 NOTE — PROGRESS NOTES
[facility] AT [address] 55  Phone: [phone] Fax: [phone]  PROGRESS NOTE  Patient Name: Shana Gutierrez : 1960   Treatment/Medical Diagnosis: Other low back pain [M54.59]   Referral Source: Lena Cantrell,*     Date of Initial Visit: 10/11/22 Attended Visits: 15 Missed Visits: 1     SUMMARY OF TREATMENT  Pt is a pleasant 57 y.o. female who presents with c/o low back pain and impaired gait/mobility. The patient reports an acute onset of low back pain following a fall directly onto her butt when lifting a heavy bed; she now describes lingering dull low back pain and lumbar spasms with prolonged standing, lifting, and certain twisting motions. Treatment has consisted of the following: Therapeutic exercise, Therapeutic activities, Neuromuscular re-education, Electrical stimulation, Gait/balance training, Manual therapy, Patient education, Functional mobility training, and Self Care training. CURRENT STATUS  Patient has attended PT for 15 sessions for the treatment of low back pain. The patient demonstrates excellent progress at this time. She reports improved hip strength/endurance, improved ease with squatting, improved lumbar mobility, and decreased pain with daily activities. She demos improved squat tolerance with 30\" STS test and improving technique with floor<>waist lifts and lifting overhead. She cont to remain challenged with prolonged ambulation and her balance is impacted by continued hip deficits in closed chain.  Additional assessment includes:  Subjective Gains: all movement is easier, decreased low back pain (more discomfort than true pain at this time), improved ease getting in/out of car, increased ease lifting items from floor  Subjective Deficits: difficulty with lifting increased weight from floor, difficulty with prolonged ambulation  % improvement: 80%  Pain   Average: 1-2/10 Best: 0/10                   Worst: 4-5/10  Patient Goal: \"improve balance and strength, develop more consistent form\"  Objective Measures:   Lumbar AROM:                                           AROM (% of full)                  Right Left Effect   Flexion 100     Extension 50 tension   Side Bend 1\" above knee 1\" above knee     Rotation 75 75                                                  LE Strength:    Right (/5) Left (/5)   Hip     Flexion 5 5             Abduction 5 5             Adduction                 Extension 4 4             ER 4+ 4+             IR 5 5   Knee   Extension 5 5              Flexion 5 5      Functional Squat: B Ant knee translation, improved with cues for hip hinge  30\" STS= 15 repetitions   Gait:  2MWT= 449' RPE 2-3/10  Goals 473'   SLS: R: 18 sec,  L: >30 sec, B Trendelenberg sign noted B    Current Goals:  - Goal: Pt to report < 4/10 pain at worst to increase ease with ADLs. Status at last note/certification: 3/28 pain at worst   Current: progressing, 4-5/10 pain at worst (12/8/22)  - Goal: Pt to demonstrate 5/5 B hip extension MMT to increase ease of stair negotiation. Status at last note/certification:L: 4-/5, R: 4/5  Current: progressing, 4/5 B (12/8/22)  - Goal: Pt to report FOTO score of at least 57 pts to demonstrate improved function and quality of life. Status at last note/certification: 54 pts   Current: progressing, FOTO 56 pts (12/8/22)  - Goal: Pt to demo R SLS of at least 30 seconds to improve ease with curb negotiation. Status at last note/certification: R: 19 sec  Current: not met, remains 18\" on average (12/8/22)        New Goals to be achieved in __8__  treatments:  - Goal: Pt to report < 4/10 pain at worst to increase ease with ADLs. Status at last note/certification: 5-6/76 pain at worst   - Goal: Pt to demonstrate 5/5 B hip extension MMT to increase ease of stair negotiation.   Status at last note/certification: 4/5 B   - Goal: Pt to report FOTO score of at least 62 pts to demonstrate improved function and quality of life. Status at last note/certification: FOTO 56 pt   - Goal: Pt to demo R SLS of at least 30 seconds to improve ease with curb negotiation. Status at last note/certification: right 18\" on average (12/8/22)  - Goal: Pt to ambulate at least 473' during 2MWT to demo improved community ambulation abilities. Status at last note/certification: 699'    RECOMMENDATIONS  Pt to continue with skilled therapy for 1-2x/week for 8 sessions to improve ease with prolonged ambulation, improve floor<>waist lifts, and decrease pain with community mobility. If you have any questions/comments please contact us directly at [phone]   Thank you for allowing us to assist in the care of your patient. Therapist Signature: Jh Townsend Date: 12/8/2022   Reporting Period  11/11/22-12/8/22 Time: 12:33 PM   NOTE TO PHYSICIAN:  PLEASE COMPLETE THE ORDERS BELOW AND FAX TO   [facility]: [phone]. If you are unable to process this request in 24 hours please contact our office: [phone].  ___ I have read the above report and request that my patient continue as recommended.   ___ I have read the above report and request that my patient continue therapy with the following changes/special instructions:_________________________________________________________   ___ I have read the above report and request that my patient be discharged from therapy.      Physician Signature:        Date:       Time:                                                        Alexandra Salazar,*.

## 2022-12-14 ENCOUNTER — HOSPITAL ENCOUNTER (OUTPATIENT)
Dept: PHYSICAL THERAPY | Age: 62
Discharge: HOME | End: 2022-12-14
Payer: MEDICARE

## 2022-12-14 PROCEDURE — 97530 THERAPEUTIC ACTIVITIES: CPT

## 2022-12-14 PROCEDURE — 97110 THERAPEUTIC EXERCISES: CPT

## 2022-12-14 PROCEDURE — 97112 NEUROMUSCULAR REEDUCATION: CPT

## 2022-12-14 NOTE — PROGRESS NOTES
PT DAILY TREATMENT NOTE     Patient Name: Kierra Lechuga  NINP:  : 1960  [x]  Patient  Verified  Payor: Prachi Gi / Plan: [facility] [place] / Product Type: Managed Care Medicare /    In time:2:50  Out time:3:43  Total Treatment Time (min): 53  Visit #: 1 of 8    Medicare/BCBS Only   Total Timed Codes (min):  43 1:1 Treatment Time:  43       Treatment Area: Other low back pain [M54.59]    SUBJECTIVE  Pain Level (0-10 scale): 2-3  Any medication changes, allergies to medications, adverse drug reactions, diagnosis change, or new procedure performed?: [x] No    [] Yes (see summary sheet for update)  Subjective functional status/changes:   [] No changes reported  \"I helped an elderly lady move a big piece of furniture today, that was a lot for my back so I am a bit more sore today. \"    OBJECTIVE    Modality rationale: decrease pain and increase tissue extensibility to improve the patients ability to perform ADLs and rest comfortably following therapy.     Min Type Additional Details    [] Estim:  []Unatt       []IFC  []Premod                        []Other:  []w/ice   []w/heat  Position:   Location:     [] Estim: []Att    []TENS instruct  []NMES                    []Other:  []w/US   []w/ice   []w/heat  Position:   Location:     []  Traction: [] Cervical       []Lumbar                       [] Prone          []Supine                       []Intermittent   []Continuous Lbs:  [] before manual  [] after manual    []  Ultrasound: []Continuous   [] Pulsed                           []1MHz   []3MHz W/cm2:  Location:    []  Iontophoresis with dexamethasone         Location: [] Take home patch   [] In clinic   10 []  Ice     []  heat  []  Ice massage  []  Laser   []  Anodyne Position: supine with wedge  Location: low back    []  Laser with stim  []  Other:  Position:  Location:    []  Vasopneumatic Device    []  Right     []  Left  Pre-treatment girth:  Post-treatment girth:  Measured at (location):  Pressure:       [] lo [] med [] hi   Temperature: [] lo [] med [] hi   [x] Skin assessment post-treatment:  [x]intact []redness- no adverse reaction    []redness - adverse reaction:     16 min Therapeutic Exercise:  [x] See flow sheet :   Rationale: increase ROM and increase strength to improve LE strength/mobility and  lumbar mobility to improve ease of ADLs and gait. 12 min Therapeutic Activity:  [x]  See flow sheet :   Rationale: increase strength, improve coordination, improve balance, and increase proprioception  to improve the patients ability to perform transfers, stair negotiation, functional lifts, and functional carries. Pt education: squat technique, deadlift technique     15 min Neuromuscular Re-education:  [x]  See flow sheet :   Rationale: increase strength, improve coordination, improve balance and increase proprioception  to improve the patients ability to perform functional tasks with improved abdominal brace utilization, improved control, and decreased risk for falls. With   [] TE   [] TA   [] neuro   [] other: Patient Education: [x] Review HEP    [] Progressed/Changed HEP based on:   [] positioning   [] body mechanics   [] transfers   [] heat/ice application    [] other:      Other Objective/Functional Measures:   -Patient significantly fatigued with prone swimmers and PNF lifts with resistance     Pain Level (0-10 scale) post treatment: 0    ASSESSMENT/Changes in Function: Patient continues to demonstrate improving durability during goblet squats/dead lifts indicating improved standing/activity tolerance. She reports that she is to begin an increased walking program in the coming weeks; advised patient to begin improving walking tolerance slowly with short bouts initially.     Patient will continue to benefit from skilled PT services to modify and progress therapeutic interventions, address functional mobility deficits, address ROM deficits, address strength deficits, analyze and address soft tissue restrictions, analyze and cue movement patterns, analyze and modify body mechanics/ergonomics, assess and modify postural abnormalities and address imbalance/dizziness to attain remaining goals. []  See Plan of Care  []  See progress note/recertification  []  See Discharge Summary         Progress towards goals / Updated goals:  - Goal: Pt to report < 4/10 pain at worst to increase ease with ADLs. Status at last note/certification: 9-7/64 pain at worst   Current:   - Goal: Pt to demonstrate 5/5 B hip extension MMT to increase ease of stair negotiation. Status at last note/certification: 4/5 B   Current: addressing with squats/dead lifts (12/14/22)  - Goal: Pt to report FOTO score of at least 57 pts to demonstrate improved function and quality of life. Status at last note/certification: FOTO 56 pt  Current:    - Goal: Pt to demo R SLS of at least 30 seconds to improve ease with curb negotiation. Status at last note/certification: right 18\" on average  Current:   - Goal: Pt to ambulate at least 473' during 2MWT to demo improved community ambulation abilities.   Status at last note/certification: 429'  Current: addressing with farmer's carries (12/14/22)    PLAN  [x]  Upgrade activities as tolerated     [x]  Continue plan of care  []  Update interventions per flow sheet       []  Discharge due to:_  []  Other:_      Godfrey Balling 12/14/2022  2:54 PM    Future Appointments   Date Time Provider Hao Gibson   12/20/2022 10:45 AM Blair Cool [facility]   12/28/2022 10:00 AM Blair Cool [facility]

## 2022-12-20 ENCOUNTER — APPOINTMENT (OUTPATIENT)
Dept: PHYSICAL THERAPY | Age: 62
End: 2022-12-20
Payer: MEDICARE

## 2022-12-28 ENCOUNTER — HOSPITAL ENCOUNTER (OUTPATIENT)
Dept: PHYSICAL THERAPY | Age: 62
Discharge: HOME | End: 2022-12-28
Payer: MEDICARE

## 2022-12-28 PROCEDURE — 97112 NEUROMUSCULAR REEDUCATION: CPT

## 2022-12-28 PROCEDURE — 97110 THERAPEUTIC EXERCISES: CPT

## 2022-12-28 PROCEDURE — 97530 THERAPEUTIC ACTIVITIES: CPT

## 2022-12-28 NOTE — PROGRESS NOTES
PT DAILY TREATMENT NOTE     Patient Name: Nikki Valdez  PPDB:  : 1960  [x]  Patient  Verified  Payor: [employer] / Plan: [address] [place] [address] / Product Type: Managed Care Medicare /    In time:10:04  Out time:11:00  Total Treatment Time (min): 56  Visit #: 2 of 8    Medicare/BCBS Only   Total Timed Codes (min):  46 1:1 Treatment Time:  43       Treatment Area: Other low back pain [M54.59]    SUBJECTIVE  Pain Level (0-10 scale): 3  Any medication changes, allergies to medications, adverse drug reactions, diagnosis change, or new procedure performed?: [x] No    [] Yes (see summary sheet for update)  Subjective functional status/changes:   [] No changes reported  \"I want to get a good program set up for home\"    OBJECTIVE    Modality rationale: decrease pain to improve the patients ability to perform ADLs and rest comfortably following therapy.     Min Type Additional Details    [] Estim:  []Unatt       []IFC  []Premod                        []Other:  []w/ice   []w/heat  Position:   Location:     [] Estim: []Att    []TENS instruct  []NMES                    []Other:  []w/US   []w/ice   []w/heat  Position:   Location:     []  Traction: [] Cervical       []Lumbar                       [] Prone          []Supine                       []Intermittent   []Continuous Lbs:  [] before manual  [] after manual    []  Ultrasound: []Continuous   [] Pulsed                           []1MHz   []3MHz W/cm2:  Location:    []  Iontophoresis with dexamethasone         Location: [] Take home patch   [] In clinic   10 []  Ice     [x]  heat  []  Ice massage  []  Laser   []  Anodyne Position: supine   Location: low back    []  Laser with stim  []  Other:  Position:  Location:    []  Vasopneumatic Device    []  Right     []  Left  Pre-treatment girth:  Post-treatment girth:  Measured at (location):  Pressure:       [] lo [] med [] hi   Temperature: [] lo [] med [] hi   [x] Skin assessment post-treatment:  [x]intact []redness- no adverse reaction    []redness - adverse reaction:     13 min Therapeutic Exercise:  [x] See flow sheet :   Rationale: increase ROM and increase strength to improve LE strength/mobility and  lumbar mobility to improve ease of ADLs and gait. 18 min Therapeutic Activity:  [x]  See flow sheet :   Rationale: increase strength, improve coordination, improve balance, and increase proprioception  to improve the patients ability to perform transfers, stair negotiation, functional lifts, and functional carries. Pt education: updated HEP     15 min Neuromuscular Re-education:  [x]  See flow sheet :   Rationale: increase strength, improve coordination, improve balance and increase proprioception  to improve the patients ability to perform functional tasks with improved abdominal brace utilization, improved control, and decreased risk for falls. With   [] TE   [] TA   [] neuro   [] other: Patient Education: [x] Review HEP    [] Progressed/Changed HEP based on:   [] positioning   [] body mechanics   [] transfers   [] heat/ice application    [] other:      Other Objective/Functional Measures:   -Patient provided with updated HEP and black theraband      Pain Level (0-10 scale) post treatment: 0    ASSESSMENT/Changes in Function: Minimal verbal cues required for correct technique with squats/dead lifts to encourage scapular retraction throughout duration of movement. Patient encouraged by improved endurance/lifting capacity with goblet squats/dead lifts. Patient will continue to benefit from skilled PT services to modify and progress therapeutic interventions, address functional mobility deficits, address ROM deficits, address strength deficits, analyze and address soft tissue restrictions, analyze and cue movement patterns, analyze and modify body mechanics/ergonomics, assess and modify postural abnormalities and address imbalance/dizziness to attain remaining goals. []  See Plan of Care  []  See progress note/recertification  []  See Discharge Summary         Progress towards goals / Updated goals:  - Goal: Pt to report < 4/10 pain at worst to increase ease with ADLs. Status at last note/certification: 8-0/97 pain at worst   Current: progressing, pain 2-3/10 on average in recent sessions (12/28/22)  - Goal: Pt to demonstrate 5/5 B hip extension MMT to increase ease of stair negotiation. Status at last note/certification: 4/5 B   Current: addressing with squats/dead lifts (12/14/22)  - Goal: Pt to report FOTO score of at least 57 pts to demonstrate improved function and quality of life. Status at last note/certification: FOTO 56 pt  Current:    - Goal: Pt to demo R SLS of at least 30 seconds to improve ease with curb negotiation. Status at last note/certification: right 18\" on average  Current:   - Goal: Pt to ambulate at least 473' during 2MWT to demo improved community ambulation abilities.   Status at last note/certification: 349'  Current: addressing with farmer's carries (12/14/22)    PLAN  [x]  Upgrade activities as tolerated     [x]  Continue plan of care  []  Update interventions per flow sheet       []  Discharge due to:_  []  Other:_      Kalyani Julien 12/28/2022  9:06 AM    Future Appointments   Date Time Provider Hao Gibson   12/28/2022 10:00 AM Sigifredo Cornelius [facility]   12/30/2022 12:45 PM [facility]

## 2022-12-30 ENCOUNTER — HOSPITAL ENCOUNTER (OUTPATIENT)
Dept: PHYSICAL THERAPY | Age: 62
End: 2022-12-30
Payer: MEDICARE

## 2022-12-30 PROCEDURE — 97112 NEUROMUSCULAR REEDUCATION: CPT

## 2022-12-30 PROCEDURE — 97110 THERAPEUTIC EXERCISES: CPT

## 2022-12-30 PROCEDURE — 97530 THERAPEUTIC ACTIVITIES: CPT

## 2022-12-30 NOTE — PROGRESS NOTES
PT DAILY TREATMENT NOTE     Patient Name: Archie Amato  SDBJ:  : 1960  [x]  Patient  Verified  Payor: Harleen Mahmood / Plan: [employer] / Product Type: Managed Care Medicare /    In time: 12:49 pm           Out time: 1:50 pm  Total Treatment Time (min): 61  Visit #: 3 of 8    Medicare/BCBS Only   Total Timed Codes (min):  51 1:1 Treatment Time:  45       Treatment Area: Other low back pain [M54.59]    SUBJECTIVE  Pain Level (0-10 scale): 2-3  Any medication changes, allergies to medications, adverse drug reactions, diagnosis change, or new procedure performed?: [x] No    [] Yes (see summary sheet for update)  Subjective functional status/changes:   [] No changes reported  Patient notes doing well. OBJECTIVE  Modality rationale: decrease pain to improve the patients ability to perform ADLs and rest comfortably following therapy. Min Type Additional Details   10 []  Ice     [x]  Heat   Position: supine   Location: low back   [x] Skin assessment post-treatment:  [x]intact []redness- no adverse reaction    []redness - adverse reaction:     14 min Therapeutic Exercise:  [x] See flow sheet:    Rationale: increase ROM and increase strength to improve LE strength/mobility and  lumbar mobility to improve ease of ADLs and gait. 18 min Therapeutic Activity:  [x]  See flow sheet:    Rationale: increase strength, improve coordination, improve balance, and increase proprioception  to improve the patients ability to perform transfers, stair negotiation, functional lifts, and functional carries. Pt education: updated HEP     19 min Neuromuscular Re-education:  [x]  See flow sheet: added stork   Rationale: increase strength, improve coordination, improve balance and increase proprioception  to improve the patients ability to perform functional tasks with improved abdominal brace utilization, improved control, and decreased risk for falls.           With   [x] TE   [x] TA [x] neuro   [] other: Patient Education: [x] Review HEP - added stork     Other Objective/Functional Measures:   SLS (R) 16\", (L) 11\"    Pain Level (0-10 scale) post treatment: 0    ASSESSMENT/Changes in Function:   Patient req cueing to increase base of support and for cognizant hip ER/ABD during all resisted chop/lift attempts to improve lateral abdominal activation. Patient will continue to benefit from skilled PT services to modify and progress therapeutic interventions, address functional mobility deficits, address ROM deficits, address strength deficits, analyze and address soft tissue restrictions, analyze and cue movement patterns, analyze and modify body mechanics/ergonomics, assess and modify postural abnormalities and address imbalance/dizziness to attain remaining goals. []  See Plan of Care  []  See progress note/recertification  []  See Discharge Summary         Progress towards goals / Updated goals:  - Goal: Pt to report < 4/10 pain at worst to increase ease with ADLs. Status at last note/certification: 0-2/91 pain at worst   Current: progressing, pain 2-3/10 on average in recent sessions (12/28/22)  - Goal: Pt to demonstrate 5/5 B hip extension MMT to increase ease of stair negotiation. Status at last note/certification: 4/5 B   Current: addressing with squats/dead lifts (12/14/22)  - Goal: Pt to report FOTO score of at least 57 pts to demonstrate improved function and quality of life. Status at last note/certification: FOTO 56 pt  Current:    - Goal: Pt to demo R SLS of at least 30 seconds to improve ease with curb negotiation. Status at last note/certification: right 18\" on average  Current: goal progressing; right \"16 best (12/30/22)  - Goal: Pt to ambulate at least 473' during 2MWT to demo improved community ambulation abilities.   Status at last note/certification: 082'  Current: addressing with farmer's carries (12/14/22)    PLAN  [x]  Upgrade activities as tolerated     [x] Continue plan of care  []  Update interventions per flow sheet       []  Discharge due to:_  []  Other:_      La Nena Mims, PASQUALE 12/30/2022    Future Appointments   Date Time Provider Hao Gibson   1/4/2023  1:30 PM Ligia Fisher [facility]   1/9/2023  9:30 AM Ligia Fisher [facility]

## 2023-01-04 ENCOUNTER — APPOINTMENT (OUTPATIENT)
Dept: PHYSICAL THERAPY | Age: 63
End: 2023-01-04
Payer: MEDICARE

## 2023-01-06 ENCOUNTER — APPOINTMENT (OUTPATIENT)
Dept: PHYSICAL THERAPY | Age: 63
End: 2023-01-06
Payer: MEDICARE

## 2023-01-09 ENCOUNTER — DOCUMENTATION ONLY (OUTPATIENT)
Dept: INTERNAL MEDICINE CLINIC | Age: 63
End: 2023-01-09

## 2023-01-09 ENCOUNTER — HOSPITAL ENCOUNTER (OUTPATIENT)
Dept: PHYSICAL THERAPY | Age: 63
Discharge: HOME | End: 2023-01-09
Payer: MEDICARE

## 2023-01-09 PROCEDURE — 97530 THERAPEUTIC ACTIVITIES: CPT

## 2023-01-09 NOTE — PROGRESS NOTES
Physical Therapy Discharge Instructions      [facility] - [address] [facility]  [phone] fax    Patient: Kristal Jacobsen  : 1960    Continue Home Exercise Program at a sustainable pace. Stretching to be performed 5-7 times per week and strengthening 2-3 times per week. Continue with    [] Ice  as needed 2-3 times per day    [x] Heat           Follow up with MD:     [] Upon completion of therapy     [x] As needed      Recommendations:     [x]   Return to activity with home program    []   Return to activity with the following modifications:       []Post Rehab Program    []Join Independent aquatic program     []Return to/join local gym    Additional Comments: Ozzy Balbuena on a fantastic course of therapy and taking the steps to be more active and healthy with your home program. It has been a pleasure working with you. Keep up the good work and please reach out in the future if you have any questions.     Jh Townsend PT, DPT 2023 9:50 AM

## 2023-01-09 NOTE — PROGRESS NOTES
PT DAILY TREATMENT NOTE     Patient Name: Kristal Jacobsen  IMBS:[id number]  : 1960  [x]  Patient  Verified  Payor: Rina Board / Plan: [employer] / Product Type: Managed Care Medicare /    In time:9:35  Out time:10:23  Total Treatment Time (min): 48  Visit #: 4 of 8    Medicare/BCBS Only   Total Timed Codes (min):  38 1:1 Treatment Time:  30       Treatment Area: Other low back pain [M54.59]    SUBJECTIVE  Pain Level (0-10 scale): 0  Any medication changes, allergies to medications, adverse drug reactions, diagnosis change, or new procedure performed?: [x] No    [] Yes (see summary sheet for update)  Subjective functional status/changes:   [] No changes reported  \"I had a fainting spell last week and was violently sick to my stomach and had a headache. I will be happy once I can see the cardiologist\"    OBJECTIVE    Modality rationale: decrease pain and increase tissue extensibility to improve the patients ability to perform ADLs and rest comfortably following therapy.     Min Type Additional Details    [] Estim:  []Unatt       []IFC  []Premod                        []Other:  []w/ice   []w/heat  Position:   Location: 1    [] Estim: []Att    []TENS instruct  []NMES                    []Other:  []w/US   []w/ice   []w/heat  Position:   Location:     []  Traction: [] Cervical       []Lumbar                       [] Prone          []Supine                       []Intermittent   []Continuous Lbs:  [] before manual  [] after manual    []  Ultrasound: []Continuous   [] Pulsed                           []1MHz   []3MHz W/cm2:  Location:    []  Iontophoresis with dexamethasone         Location: [] Take home patch   [] In clinic   10 []  Ice     [x]  heat  []  Ice massage  []  Laser   []  Anodyne Position: recline  Location: low back    []  Laser with stim  []  Other:  Position:  Location:    []  Vasopneumatic Device    []  Right     []  Left  Pre-treatment girth:  Post-treatment girth: Measured at (location):  Pressure:       [] lo [] med [] hi   Temperature: [] lo [] med [] hi   [x] Skin assessment post-treatment:  [x]intact []redness- no adverse reaction    []redness - adverse reaction:     5 min Therapeutic Exercise:  [x] See flow sheet :   Rationale: increase ROM and increase strength to improve LE strength/mobility and  lumbar mobility to improve ease of ADLs and gait. 30 min Therapeutic Activity:  [x]  See flow sheet :   Rationale: increase strength, improve coordination, improve balance, and increase proprioception  to improve the patients ability to perform transfers, stair negotiation, functional lifts, and functional carries. Pt education: discharge planning, progress toward goals, review of HEP, reassessment     3 min Neuromuscular Re-education:  [x]  See flow sheet :   Rationale: increase strength, improve coordination, improve balance and increase proprioception  to improve the patients ability to perform functional tasks with improved abdominal brace utilization, improved control, and decreased risk for falls.             With   [] TE   [] TA   [] neuro   [] other: Patient Education: [x] Review HEP    [] Progressed/Changed HEP based on:   [] positioning   [] body mechanics   [] transfers   [] heat/ice application    [] other:      Other Objective/Functional Measures:   Lumbar AROM:                                           AROM (% of full)                  Right Left Effect   Flexion 100     Extension 50 tension   Side Bend 1\" above knee 1\" above knee     Rotation 75 75                                                  LE Strength:    Right (/5) Left (/5)   Hip     Flexion 5 5             Abduction 5 5             Adduction                 Extension 4+ 4+             ER 5 5             IR 5 5   Knee   Extension 5 5              Flexion 5 5    Bridge Height: 75%    Gait:  2MWT= 550' RPE 3-4/10  Goals 473'   SLS:  right 9\", left 30\"    Pain Level (0-10 scale) post treatment: 0    ASSESSMENT/Changes in Function: Pt attended therapy consistently for 19 visits for the treatment of low back pain. At this point, the patient reports 90% improvement since [facility] with specific improvements in the following areas: improved hip strength, improved ability to perform functional lifts/carries, improved balance, and decreased pain levels on average. The patient has also demonstrated improvement in her FOTO score from 37 pts to 94 pts, demonstrating improved function in the home and community. The patient is appropriate for discharge at this time with a comprehensive HEP and will follow up with our office about any questions that arise following discharge. Thank you for this referral.      Patient will continue to benefit from skilled PT services to modify and progress therapeutic interventions, address functional mobility deficits, address ROM deficits, address strength deficits, analyze and address soft tissue restrictions, analyze and cue movement patterns, analyze and modify body mechanics/ergonomics, assess and modify postural abnormalities and address imbalance/dizziness to attain remaining goals. []  See Plan of Care  []  See progress note/recertification  []  See Discharge Summary         Progress towards goals / Updated goals:  - Goal: Pt to report < 4/10 pain at worst to increase ease with ADLs. Status at last note/certification: 1-6/69 pain at worst   Current: met, pt reports more discomfort than pain in low back at this time (1/9/23)  - Goal: Pt to demonstrate 5/5 B hip extension MMT to increase ease of stair negotiation. Status at last note/certification: 4/5 B   Current: progressing, 4+/5 B (1/9/23)  - Goal: Pt to report FOTO score of at least 57 pts to demonstrate improved function and quality of life.   Status at last note/certification: FOTO 56 pt  Current:  met, FOTO 94 pts (1/9/23)  - Goal: Pt to demo R SLS of at least 30 seconds to improve ease with curb negotiation. Status at last note/certification: right 18\" on average  Current: goal progressing; right \"16 best (12/30/22)  - Goal: Pt to ambulate at least 473' during 2MWT to demo improved community ambulation abilities.   Status at last note/certification: 229'  Current: met, 26' with RPE 3-4/10 (1/9/23)    PLAN  [x]  Upgrade activities as tolerated     [x]  Continue plan of care  []  Update interventions per flow sheet       []  Discharge due to:_  []  Other:_      Evin Bauer 1/9/2023  7:25 AM    Future Appointments   Date Time Provider Hao Gibson   1/9/2023  9:30 AM [facility]

## 2023-01-10 NOTE — PROGRESS NOTES
[facility] AT [address]. [place] [address], [facility], [place] [address]  Phone: [phone] Fax [phone] SUMMARY  Patient Name: Pravin Perez : 1960   Treatment/Medical Diagnosis: Other low back pain [M54.59]   Referral Source: Nancy Booth,*     Date of Initial Visit: 10/11/23 Attended Visits: 19 Missed Visits: 2     SUMMARY OF TREATMENT  Pt is a pleasant 57 y.o. female who presents with c/o low back pain and impaired gait/mobility. The patient reports an acute onset of low back pain following a fall directly onto her butt when lifting a heavy bed; she now describes lingering dull low back pain and lumbar spasms with prolonged standing, lifting, and certain twisting motions. Treatment has consisted of the following: Therapeutic exercise, Therapeutic activities, Neuromuscular re-education, Electrical stimulation, Gait/balance training, Manual therapy, Patient education, Functional mobility training, and Self Care training. CURRENT STATUS  Pt attended therapy consistently for 19 visits for the treatment of low back pain. At this point, the patient reports 90% improvement since [facility] with specific improvements in the following areas: improved hip strength, improved ability to perform functional lifts/carries, improved balance, and decreased pain levels on average. The patient has also demonstrated improvement in her FOTO score from 37 pts to 94 pts, demonstrating improved function in the home and community. The patient is appropriate for discharge at this time with a comprehensive HEP and will follow up with our office about any questions that arise following discharge.  Thank you for this referral.    Lumbar AROM:                                           AROM (% of full)                  Right Left Effect   Flexion 100     Extension 50 tension   Side Bend 1\" above knee 1\" above knee     Rotation 75 75 LE Strength:    Right (/5) Left (/5)   Hip     Flexion 5 5             Abduction 5 5             Adduction                 Extension 4+ 4+             ER 5 5             IR 5 5   Knee   Extension 5 5              Flexion 5 5    Bridge Height: 75%     Gait:  2MWT= 550' RPE 3-4/10  Goals 473'   SLS:  right 9\", left 30\"    - Goal: Pt to report < 4/10 pain at worst to increase ease with ADLs. Status at last note/certification: 8-9/18 pain at worst   Current: met, pt reports more discomfort than pain in low back at this time (1/9/23)  - Goal: Pt to demonstrate 5/5 B hip extension MMT to increase ease of stair negotiation. Status at last note/certification: 4/5 B   Current: progressing, 4+/5 B (1/9/23)  - Goal: Pt to report FOTO score of at least 57 pts to demonstrate improved function and quality of life. Status at last note/certification: FOTO 56 pt  Current:  met, FOTO 94 pts (1/9/23)  - Goal: Pt to demo R SLS of at least 30 seconds to improve ease with curb negotiation. Status at last note/certification: right 18\" on average  Current: goal progressing; right \"16 best (12/30/22)  - Goal: Pt to ambulate at least 473' during 2MWT to demo improved community ambulation abilities. Status at last note/certification: 419'  Current: met, 26' with RPE 3-4/10 (1/9/23)    RECOMMENDATIONS  Discontinue therapy. Progressing towards or have reached established goals. If you have any questions/comments please contact us directly at [phone]. Thank you for allowing us to assist in the care of your patient.   Therapist Signature: Mayco Bryant Date: 1/10/23   Reporting Period: 12/9/23-1/9/23 Time: 11:25 AM

## 2023-06-22 ENCOUNTER — HOSPITAL ENCOUNTER (OUTPATIENT)
Facility: HOSPITAL | Age: 63
Setting detail: RECURRING SERIES
Discharge: HOME | End: 2023-06-25
Payer: MEDICARE

## 2023-06-22 PROCEDURE — 97162 PT EVAL MOD COMPLEX 30 MIN: CPT

## 2023-07-10 ENCOUNTER — HOSPITAL ENCOUNTER (OUTPATIENT)
Facility: HOSPITAL | Age: 63
Setting detail: RECURRING SERIES
Discharge: HOME | End: 2023-07-13
Payer: MEDICARE

## 2023-07-10 PROCEDURE — 90912 BFB TRAINING 1ST 15 MIN: CPT

## 2023-07-10 PROCEDURE — 90913 BFB TRAINING EA ADDL 15 MIN: CPT

## 2023-07-10 NOTE — PROGRESS NOTES
PHYSICAL / OCCUPATIONAL THERAPY - DAILY TREATMENT NOTE (updated )    Patient Name: Leda Crowley    Date: 7/10/2023    : 1960  Insurance: Payor: Dany Barcenas / Plan: Dany Barcenas / Product Type: *No Product type* /      Patient  verified Yes     Visit #   Current / Total 2 8   Time   In / Out 9:06 9:45   Pain   In / Out 5 back pain 5   Subjective Functional Status/Changes: Patient reports doing quick contractions 3+times a day lying down. She has noticed  a difference in being able to get to the bathroom without leaking. TREATMENT AREA =  Mixed incontinence [N39.46]    OBJECTIVE         Therapeutic Procedures: Tx Min Billable or 1:1 Min (if diff from Tx Min) Procedure, Rationale, Specifics   15  80269 Biofeedback Training, initial 15 (timed): Improve pelvic floor muscle contraction/relaxation in order to progress PLOF and address remaining functional goals. Details if applicable:       15  98284 Biofeedback Training, add'l 15 (timed): Improve pelvic floor muscle contraction/relaxation in order to progress PLOF and address remaining functional goals. Details if applicable:     9  [id number] Biofeedback Training, add'l 15 (timed): Improve pelvic floor muscle contraction/relaxation in order to progress PLOF and address remaining functional goals.        Details if applicable:            Details if applicable:            Details if applicable:     44  [facility] Totals Reminder: bill using total billable min of TIMED therapeutic procedures (example: do not include dry needle or estim unattended, both untimed codes, in totals to left)  8-22 min = 1 unit; 23-37 min = 2 units; 38-52 min = 3 units; 53-67 min = 4 units; 68-82 min = 5 units   Total Total     [x]  Patient Education billed concurrently with other procedures   [x] Review HEP    [] Progressed/Changed HEP, detail:    [] Other detail:       Objective Information/Functional Measures/Assessment    Patient demonstrates impaired

## 2023-07-17 ENCOUNTER — HOSPITAL ENCOUNTER (OUTPATIENT)
Facility: HOSPITAL | Age: 63
Setting detail: RECURRING SERIES
Discharge: HOME | End: 2023-07-20
Payer: MEDICARE

## 2023-07-17 PROCEDURE — 90912 BFB TRAINING 1ST 15 MIN: CPT

## 2023-07-17 PROCEDURE — 90913 BFB TRAINING EA ADDL 15 MIN: CPT

## 2023-07-17 PROCEDURE — 97535 SELF CARE MNGMENT TRAINING: CPT

## 2023-07-17 NOTE — PROGRESS NOTES
procedures   [x] Review HEP    [x] Progressed/Changed HEP, detail:  advance pelvic floor HEP to 2x day in seated position, 1x day in supine position with 5 second slow twitch contractions. [] Other detail:       Objective Information/Functional Measures/Assessment    Patient demonstrates improved strength of pelvic floor contractions on biofeedback in supine. Patient reports improved ability to start a urine stream.      Patient will continue to benefit from skilled PT / OT services to modify and progress therapeutic interventions, analyze and address strength deficits, instruct in home and community integration, and address ABAD, difficulty initiating a urine stream  to address functional deficits and attain remaining goals. Progress toward goals / Updated goals:  []  See Progress Note/Recertification    Patient met STG #1 with execution of HEP 3x day.     Next PN/ RC due 7/22/2023    PLAN  Yes  Continue plan of care  [x]  Upgrade activities as tolerated  []  Discharge due to :  []  Other:    Bety Bauer PT    7/17/2023    9:43    Future Appointments   Date Time Provider 4600  46[facility]   7/24/2023  9:00 AM Bety Bauer PT [facility]   7/31/2023  9:00 AM Bety Bauer PT [facility]   8/7/2023  9:00 AM Bety Bauer PT [facility]   8/14/2023  9:00 AM Bety Bauer PT [facility]   8/21/2023  9:00 AM Bety Bauer PT [address] [facility] "Dr. Wallace told me I don't need one"/no

## 2023-07-24 ENCOUNTER — HOSPITAL ENCOUNTER (OUTPATIENT)
Facility: HOSPITAL | Age: 63
Setting detail: RECURRING SERIES
Discharge: HOME | End: 2023-07-27
Payer: MEDICARE

## 2023-07-24 PROCEDURE — 90912 BFB TRAINING 1ST 15 MIN: CPT

## 2023-07-24 PROCEDURE — 97535 SELF CARE MNGMENT TRAINING: CPT
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
## 2023-07-24 NOTE — PROGRESS NOTES
strength deficits, instruct in home and community integration, and Address ABAD  to address functional deficits and attain remaining goals.     Progress toward goals / Updated goals:  [x]  See Progress Note/Recertification          Auth due 9/22/2023    PLAN  Yes  Continue plan of care  [x]  Upgrade activities as tolerated  []  Discharge due to :  []  Other:    Lev Johnson, PT    7/24/2023    9:35 AM    Future Appointments   Date Time Provider 4600  46[facility]   7/24/2023  9:00 AM Lev Johnson, PT [facility]   7/31/2023  9:00 AM Lev Johnson, PT [facility]   8/7/2023  9:00 AM Lev Johnson, PT [facility]   8/14/2023  9:00 AM Lev Johnson, PT [facility]   8/21/2023  9:00 AM Lev Johnson, PT [address] [place] [address]

## 2023-07-31 ENCOUNTER — APPOINTMENT (OUTPATIENT)
Facility: HOSPITAL | Age: 63
End: 2023-07-31
Payer: MEDICARE

## 2023-08-07 ENCOUNTER — HOSPITAL ENCOUNTER (OUTPATIENT)
Facility: HOSPITAL | Age: 63
Setting detail: RECURRING SERIES
Discharge: HOME | End: 2023-08-10
Payer: MEDICARE

## 2023-08-07 PROCEDURE — 90913 BFB TRAINING EA ADDL 15 MIN: CPT

## 2023-08-07 PROCEDURE — 90912 BFB TRAINING 1ST 15 MIN: CPT

## 2023-08-07 NOTE — PROGRESS NOTES
PHYSICAL / OCCUPATIONAL THERAPY - DAILY TREATMENT NOTE (updated )    Patient Name: Christian Joshi    Date: 2023    : 1960  Insurance: Payor: Hal Number / Plan: Adline Number / Product Type: *No Product type* /      Patient  verified Yes     Visit #   Current / Total 5 8   Time   In / Out 9:00 9:25   Pain   In / Out 5 5   Subjective Functional Status/Changes: Patient reports increased back pain. Feels it is due to the pelvic floor exercises. Has had to use TENS unit      TREATMENT AREA =  Mixed incontinence [N39.46]    OBJECTIVE         Therapeutic Procedures: Tx Min Billable or 1:1 Min (if diff from Tx Min) Procedure, Rationale, Specifics   15  32466 Biofeedback Training, initial 15 (timed): Improve pelvic floor muscle contraction/relaxation in order to progress PLOF and address remaining functional goals. Details if applicable:       10  07291 Biofeedback Training, add'l 15 (timed): Improve pelvic floor muscle contraction/relaxation in order to progress PLOF and address remaining functional goals. Details if applicable:            Details if applicable:            Details if applicable:            Details if applicable:     22  [facility] Totals Reminder: bill using total billable min of TIMED therapeutic procedures (example: do not include dry needle or estim unattended, both untimed codes, in totals to left)  8-22 min = 1 unit; 23-37 min = 2 units; 38-52 min = 3 units; 53-67 min = 4 units; 68-82 min = 5 units   Total Total     [x]  Patient Education billed concurrently with other procedures   [x] Review HEP    [x] Progressed/Changed HEP, detail:  Decrease to 3x day supine knees bent fast twitch only  [] Other detail:       Objective Information/Functional Measures/Assessment    Decreased pelvic floor muscle strength in sitting.     Patient will continue to benefit from skilled PT / OT services to modify and progress therapeutic interventions, analyze and address strength

## 2023-08-14 ENCOUNTER — HOSPITAL ENCOUNTER (OUTPATIENT)
Facility: HOSPITAL | Age: 63
Setting detail: RECURRING SERIES
Discharge: HOME | End: 2023-08-17
Payer: MEDICARE

## 2023-08-14 PROCEDURE — 97535 SELF CARE MNGMENT TRAINING: CPT

## 2023-08-14 PROCEDURE — 90912 BFB TRAINING 1ST 15 MIN: CPT

## 2023-08-14 NOTE — PROGRESS NOTES
PHYSICAL / OCCUPATIONAL THERAPY - DAILY TREATMENT NOTE (updated )    Patient Name: Jing Kennedy    Date: 2023    : 1960  Insurance: Payor: Inis Bar / Plan: InZairge Bar / Product Type: *No Product type* /      Patient  verified Yes     Visit #   Current / Total 6 8   Time   In / Out 9:00 9:37   Pain   In / Out 0 0   Subjective Functional Status/Changes: Patient reports no increased back pain doing fast contractions lying down with knees bent. PF muscles feel stronger      TREATMENT AREA =  Mixed incontinence [N39.46]    OBJECTIVE         Therapeutic Procedures: Tx Min Billable or 1:1 Min (if diff from Tx Min) Procedure, Rationale, Specifics   27  00781 Biofeedback Training, initial 15 (timed): Improve pelvic floor muscle contraction/relaxation in order to progress PLOF and address remaining functional goals. Details if applicable:       10  43007 Self Care/Home Management (timed):  improve patient knowledge and understanding of urge suppression strategies  to improve patient's ability to progress to PLOF and address remaining functional goals. (see flow sheet as applicable)     Details if applicable:            Details if applicable:            Details if applicable:            Details if applicable:     40  [facility] Totals Reminder: bill using total billable min of TIMED therapeutic procedures (example: do not include dry needle or estim unattended, both untimed codes, in totals to left)  8-22 min = 1 unit; 23-37 min = 2 units; 38-52 min = 3 units; 53-67 min = 4 units; 68-82 min = 5 units   Total Total     [x]  Patient Education billed concurrently with other procedures   [x] Review HEP    [x] Progressed/Changed HEP, detail:  advance pelvic floor HEP to 3x day in supine position legs straight for fast twitch, 1x day in supine position with 3 second slow twitch contractions.     [] Other detail:       Objective Information/Functional Measures/Assessment    Patient

## 2023-08-21 ENCOUNTER — HOSPITAL ENCOUNTER (OUTPATIENT)
Facility: HOSPITAL | Age: 63
Setting detail: RECURRING SERIES
Discharge: HOME | End: 2023-08-24
Payer: MEDICARE

## 2023-08-21 PROCEDURE — 90912 BFB TRAINING 1ST 15 MIN: CPT

## 2023-08-21 PROCEDURE — 97535 SELF CARE MNGMENT TRAINING: CPT

## 2023-08-21 PROCEDURE — 90913 BFB TRAINING EA ADDL 15 MIN: CPT

## 2023-08-21 NOTE — PROGRESS NOTES
PHYSICAL / OCCUPATIONAL THERAPY - DAILY TREATMENT NOTE (updated )    Patient Name: Zak He    Date: 2023    : 1960  Insurance: Payor: Jl Paul / Plan: Jl Paul / Product Type: *No Product type* /      Patient  verified Yes     Visit #   Current / Total 7 8   Time   In / Out 9:10 10:00   Pain   In / Out 0 0   Subjective Functional Status/Changes: Patient reports no LBP with PF HEP this week. Used urge suppression strategies and they worked. TREATMENT AREA =  Mixed incontinence [N39.46]    OBJECTIVE         Therapeutic Procedures: Tx Min Billable or 1:1 Min (if diff from Tx Min) Procedure, Rationale, Specifics   15  84427 Biofeedback Training, initial 15 (timed): Improve pelvic floor muscle contraction/relaxation in order to progress PLOF and address remaining functional goals. Details if applicable:       15  75852 Biofeedback Training, add'l 15 (timed): Improve pelvic floor muscle contraction/relaxation in order to progress PLOF and address remaining functional goals. Details if applicable:      Self Care/Home Management (timed):  improve patient knowledge and understanding of Bladder fitness  to improve patient's ability to progress to PLOF and address remaining functional goals.   (see flow sheet as applicable)     Details if applicable:            Details if applicable:            Details if applicable:     48  [facility] Totals Reminder: bill using total billable min of TIMED therapeutic procedures (example: do not include dry needle or estim unattended, both untimed codes, in totals to left)  8-22 min = 1 unit; 23-37 min = 2 units; 38-52 min = 3 units; 53-67 min = 4 units; 68-82 min = 5 units   Total Total     [x]  Patient Education billed concurrently with other procedures   [x] Review HEP    [x] Progressed/Changed HEP, detail:  Advance pelvic floor HEP to 2x day in supine position, 1x day in seated position with 5 second slow twitch

## 2023-08-28 ENCOUNTER — APPOINTMENT (OUTPATIENT)
Facility: HOSPITAL | Age: 63
End: 2023-08-28
Payer: MEDICARE

## 2023-08-30 ENCOUNTER — APPOINTMENT (OUTPATIENT)
Facility: HOSPITAL | Age: 63
End: 2023-08-30
Payer: MEDICARE

## 2023-09-06 ENCOUNTER — HOSPITAL ENCOUNTER (OUTPATIENT)
Facility: HOSPITAL | Age: 63
Setting detail: RECURRING SERIES
Discharge: HOME | End: 2023-09-09
Payer: MEDICARE

## 2023-09-06 PROCEDURE — 90913 BFB TRAINING EA ADDL 15 MIN: CPT

## 2023-09-06 PROCEDURE — 90912 BFB TRAINING 1ST 15 MIN: CPT

## 2023-09-06 PROCEDURE — 97535 SELF CARE MNGMENT TRAINING: CPT

## 2023-09-06 NOTE — PROGRESS NOTES
PHYSICAL / OCCUPATIONAL THERAPY - DAILY TREATMENT NOTE (updated )    Patient Name: Natalie Estrada    Date: 2023    : 1960  Insurance: Payor: MEDICARE / Plan: MEDICARE PART A AND B / Product Type: *No Product type* /      Patient  verified Yes     Visit #   Current / Total 8 12   Time   In / Out 11:08 11:48   Pain   In / Out 0 0   Subjective Functional Status/Changes: See recert     TREATMENT AREA =  Mixed incontinence [N39.46]    OBJECTIVE         Therapeutic Procedures: Tx Min Billable or 1:1 Min (if diff from Tx Min) Procedure, Rationale, Specifics   15  27228 Self Care/Home Management (timed):  improve patient knowledge and understanding of physical therapy expectations, procedures and progression  to improve patient's ability to progress to PLOF and address remaining functional goals. (see flow sheet as applicable)     Details if applicable:  Functional improvements and continued impairments     15  14719 Biofeedback Training, initial 15 (timed): Improve pelvic floor muscle contraction/relaxation in order to progress PLOF and address remaining functional goals. Details if applicable:     10  57626 Biofeedback Training, add'l 15 (timed): Improve pelvic floor muscle contraction/relaxation in order to progress PLOF and address remaining functional goals.        Details if applicable:            Details if applicable:            Details if applicable:     36  [facility] Totals Reminder: bill using total billable min of TIMED therapeutic procedures (example: do not include dry needle or estim unattended, both untimed codes, in totals to left)  8-22 min = 1 unit; 23-37 min = 2 units; 38-52 min = 3 units; 53-67 min = 4 units; 68-82 min = 5 units   Total Total     [x]  Patient Education billed concurrently with other procedures   [x] Review HEP    [] Progressed/Changed HEP, detail:    [] Other detail:       Objective Information/Functional Measures/Assessment    See recert    Patient will
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
## 2023-09-13 ENCOUNTER — HOSPITAL ENCOUNTER (OUTPATIENT)
Facility: HOSPITAL | Age: 63
Setting detail: RECURRING SERIES
Discharge: HOME | End: 2023-09-16
Payer: MEDICARE

## 2023-09-13 PROCEDURE — 90913 BFB TRAINING EA ADDL 15 MIN: CPT

## 2023-09-13 PROCEDURE — 97140 MANUAL THERAPY 1/> REGIONS: CPT

## 2023-09-13 PROCEDURE — 90912 BFB TRAINING 1ST 15 MIN: CPT

## 2023-09-13 NOTE — PROGRESS NOTES
PHYSICAL / OCCUPATIONAL THERAPY - DAILY TREATMENT NOTE (updated )    Patient Name: Filemon Mccarthy    Date: 2023    : 1960  Insurance: Payor: Georgia Lara / Plan: Georgia Lara / Product Type: *No Product type* /      Patient  verified Yes     Visit #   Current / Total 9 12   Time   In / Out 11:13 11:57   Pain   In / Out 0 0   Subjective Functional Status/Changes: Patient reports that sitting PF exercises are hard. Hasn't had leaking this week. TREATMENT AREA =  Mixed incontinence [N39.46]    OBJECTIVE         Therapeutic Procedures: Tx Min Billable or 1:1 Min (if diff from Tx Min) Procedure, Rationale, Specifics   15  33292 Biofeedback Training, initial 15 (timed): Improve pelvic floor muscle contraction/relaxation in order to progress PLOF and address remaining functional goals. Details if applicable:       15  18048 Biofeedback Training, add'l 15 (timed): Improve pelvic floor muscle contraction/relaxation in order to progress PLOF and address remaining functional goals. Details if applicable:     8  86259 Manual Therapy (timed): Increase strength to improve patient's ability to progress to PLOF and address remaining functional goals. The manual therapy interventions were performed at a separate and distinct time from the therapeutic activities interventions . (see flow sheet as applicable)     Details if applicable:  Manual palpation of PF contraction /. Patient educated in correct execution. 6  [id number] Biofeedback Training, add'l 15 (timed): Improve pelvic floor muscle contraction/relaxation in order to progress PLOF and address remaining functional goals.        Details if applicable:            Details if applicable:     40  [facility] Totals Reminder: bill using total billable min of TIMED therapeutic procedures (example: do not include dry needle or estim unattended, both untimed codes, in totals to left)  8-22 min = 1 unit; 23-37 min = 2 units; 38-52 min = 3

## 2023-09-18 ENCOUNTER — APPOINTMENT (OUTPATIENT)
Facility: HOSPITAL | Age: 63
End: 2023-09-18
Payer: MEDICARE

## 2023-09-25 ENCOUNTER — APPOINTMENT (OUTPATIENT)
Facility: HOSPITAL | Age: 63
End: 2023-09-25
Payer: MEDICARE

## 2023-10-02 ENCOUNTER — HOSPITAL ENCOUNTER (OUTPATIENT)
Facility: HOSPITAL | Age: 63
Setting detail: RECURRING SERIES
Discharge: HOME | End: 2023-10-05
Payer: MEDICARE

## 2023-10-02 PROCEDURE — 90912 BFB TRAINING 1ST 15 MIN: CPT

## 2023-10-02 PROCEDURE — G0283 ELEC STIM OTHER THAN WOUND: HCPCS

## 2023-10-02 PROCEDURE — 97535 SELF CARE MNGMENT TRAINING: CPT

## 2023-10-02 NOTE — PROGRESS NOTES
PHYSICAL / OCCUPATIONAL THERAPY - DAILY TREATMENT NOTE (updated )    Patient Name: Ge Baez    Date: 10/2/2023    : 1960  Insurance: Payor: Jeanette Morales / Plan: Jeanette Morales / Product Type: *No Product type* /      Patient  verified Yes     Visit #   Current / Total 10 12   Time   In / Out 11:09 11:47   Pain   In / Out 0 0   Subjective Functional Status/Changes: Patient reports she has not been progressing much. She has had a couple instances of leaking. It happens at home if she has not gotten up at night. She has to get to the bathroom right away     TREATMENT AREA =  Mixed incontinence [N39.46]    OBJECTIVE    Modalities Rationale:     increase muscle contraction/control to improve patient's ability to progress to PLOF and address remaining functional goals. 10 min [] Estim Unattended, type/location: NMES to the pelvic floor muscles via surface electrodes perianal.  50 Hz  5/10 intensity 24                                      []  w/ice    []  w/heat    min [] Estim Attended, type/location:                                     []  w/US     []  w/ice    []  w/heat    []  TENS insruct      min []  Mechanical Traction: type/lbs                   []  pro   []  sup   []  int   []  cont    []  before manual    []  after manual    min []  Ultrasound, settings/location:      min  unbill []  Ice     []  Heat    location/position:     min []  Paraffin,  details:     min []  Vasopneumatic Device, press/temp:     min []  Cassidya Jeremy / Jeannette Lasso: If using vaso (only need to measure limb vaso being performed on)      pre-treatment girth :       post-treatment girth :       measured at (landmark location) :      min []  Other:    Skin assessment post-treatment:   Intact      Therapeutic Procedures: Tx Min Billable or 1:1 Min (if diff from Tx Min) Procedure, Rationale, Specifics   15  42460 Biofeedback Training, initial 15 (timed):  Improve pelvic floor muscle contraction/relaxation in order to

## 2023-10-09 ENCOUNTER — HOSPITAL ENCOUNTER (OUTPATIENT)
Facility: HOSPITAL | Age: 63
Setting detail: RECURRING SERIES
Discharge: HOME | End: 2023-10-12
Payer: MEDICARE

## 2023-10-09 PROCEDURE — 90912 BFB TRAINING 1ST 15 MIN: CPT

## 2023-10-09 PROCEDURE — 97032 APPL MODALITY 1+ESTIM EA 15: CPT

## 2023-10-09 PROCEDURE — 90913 BFB TRAINING EA ADDL 15 MIN: CPT
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
## 2023-10-09 NOTE — PROGRESS NOTES
PHYSICAL / OCCUPATIONAL THERAPY - DAILY TREATMENT NOTE (updated )    Patient Name: Kathryn Titus    Date: 10/9/2023    : 1960  Insurance: Payor: Pj Pimentel / Plan: Pj Pimentel / Product Type: *No Product type* /      Patient  verified Yes     Visit #   Current / Total 11 12   Time   In / Out 11:08 11:50   Pain   In / Out 0 0   Subjective Functional Status/Changes: Patient reports doing HEP 3x day. She brought in a TENS/estim unit that she has used in the past      TREATMENT AREA =  Mixed incontinence [N39.46]    OBJECTIVE    Modalities Rationale:     increase muscle contraction/control to improve patient's ability to progress to PLOF and address remaining functional goals. min [] Estim Unattended, type/location:                                      []  w/ice    []  w/heat   15 min [x] Estim Attended, type/location: Patient home unit. Surface electrodes perianal.  Intensity 9. Mode 10                                    []  w/US     []  w/ice    []  w/heat    []  TENS insruct      min []  Mechanical Traction: type/lbs                   []  pro   []  sup   []  int   []  cont    []  before manual    []  after manual    min []  Ultrasound, settings/location:      min  unbill []  Ice     []  Heat    location/position:     min []  Paraffin,  details:     min []  Vasopneumatic Device, press/temp:     min []  Sharion Asher / Francesca Laws: If using vaso (only need to measure limb vaso being performed on)      pre-treatment girth :       post-treatment girth :       measured at (landmark location) :      min []  Other:    Skin assessment post-treatment:   Intact      Therapeutic Procedures: Tx Min Billable or 1:1 Min (if diff from Tx Min) Procedure, Rationale, Specifics   15  96432 Biofeedback Training, initial 15 (timed): Improve pelvic floor muscle contraction/relaxation in order to progress PLOF and address remaining functional goals.        Details if applicable:         22519 Biofeedback

## 2023-10-16 ENCOUNTER — HOSPITAL ENCOUNTER (OUTPATIENT)
Facility: HOSPITAL | Age: 63
Setting detail: RECURRING SERIES
Discharge: HOME | End: 2023-10-19
Payer: MEDICARE

## 2023-10-16 PROCEDURE — 90913 BFB TRAINING EA ADDL 15 MIN: CPT

## 2023-10-16 PROCEDURE — 97535 SELF CARE MNGMENT TRAINING: CPT

## 2023-10-16 PROCEDURE — 90912 BFB TRAINING 1ST 15 MIN: CPT

## 2023-10-16 NOTE — PROGRESS NOTES
PHYSICAL / OCCUPATIONAL THERAPY - DAILY TREATMENT NOTE (updated )    Patient Name: Leda Crowley    Date: 10/16/2023    : 1960  Insurance: Payor: Dany Barcenas / Plan: Dany Barcenas / Product Type: *No Product type* /      Patient  verified Yes     Visit #   Current / Total 11 15   Time   In / Out 9:06 9:52   Pain   In / Out 0 0   Subjective Functional Status/Changes: Patient reports that she is improving overall with leaking but still leaks if she awakens with a full bladder as she walks to the bathroom. Not consistent with HEP 3x day. Used PF stimulator 3x this past week. TREATMENT AREA =  Mixed incontinence [N39.46]    OBJECTIVE         Therapeutic Procedures: Tx Min Billable or 1:1 Min (if diff from Tx Min) Procedure, Rationale, Specifics   15  55603 Biofeedback Training, initial 15 (timed): Improve pelvic floor muscle contraction/relaxation in order to progress PLOF and address remaining functional goals. Details if applicable:       15  63860 Biofeedback Training, add'l 15 (timed): Improve pelvic floor muscle contraction/relaxation in order to progress PLOF and address remaining functional goals. Details if applicable:            Details if applicable:     16  41817 Self Care/Home Management (timed):  improve patient knowledge and understanding of ways to be consistent with HEP including coupling HEP with specific ADLs  to improve patient's ability to progress to PLOF and address remaining functional goals.   (see flow sheet as applicable)     Details if applicable:            Details if applicable:     55  [facility] Totals Reminder: bill using total billable min of TIMED therapeutic procedures (example: do not include dry needle or estim unattended, both untimed codes, in totals to left)  8-22 min = 1 unit; 23-37 min = 2 units; 38-52 min = 3 units; 53-67 min = 4 units; 68-82 min = 5 units   Total Total     [x]  Patient Education billed concurrently with other procedures

## 2023-10-25 ENCOUNTER — APPOINTMENT (OUTPATIENT)
Facility: HOSPITAL | Age: 63
End: 2023-10-25
Payer: MEDICARE

## 2023-10-28 PROBLEM — S72.001A HIP FRACTURE, RIGHT, CLOSED, INITIAL ENCOUNTER (HCC): Status: ACTIVE | Noted: 2023-10-28

## 2023-12-21 ENCOUNTER — HOSPITAL ENCOUNTER (OUTPATIENT)
Facility: HOSPITAL | Age: 63
Setting detail: RECURRING SERIES
Discharge: HOME | End: 2023-12-24
Payer: MEDICARE

## 2023-12-21 PROCEDURE — 97110 THERAPEUTIC EXERCISES: CPT

## 2023-12-21 PROCEDURE — 97140 MANUAL THERAPY 1/> REGIONS: CPT

## 2023-12-21 PROCEDURE — 97116 GAIT TRAINING THERAPY: CPT

## 2023-12-21 NOTE — PROGRESS NOTES
community integration to address functional deficits and attain remaining goals. Progress toward goals / Updated goals:  []  See Progress Note/Recertification  Goals:  Short Term Goals: To be accomplished in 4 weeks  - Goal: Pt to be compliant with initial HEP to improve ability to independently address symptoms and functional deficits. Status at last note/certification: Established and reviewed with Pt  - Goal: Pt to demo right knee AROM of 0-130 deg to improve ease of gait. Status at last note/certification: lacking 6 deg extension  - Goal: Pt to demo right hip ER AROM of at least 30 deg to improve ease of donning/doffing shoes. Status at last note/certification: 14 deg  - Goal: Pt to demo supine right hip flexion AROM to 110 deg to improve ease with gait/bed mobility. Status at last note/certification: 97 deg     Long Term Goals: To be accomplished in 8-12 weeks  - Goal: Pt to demo 5/5 right hip abduction MMT to improve ease with gait. Status at last note/certification: 3-/5  - Goal: Pt to demo 5/5 right hip extenson MMT to improve ease with gait. Status at last note/certification: 3-/5  - Goal: Pt to negotiate at least 16 steps with reciprocal pattern to improve ease with access to apartment. Status at last note/certification: 4 steps with step to pattern  - Goal: Pt will perform TUG Test in 12 seconds or less without AD to demo improved dynamic balance within the home and demo decreased risk for falls. Status at last note/certification: 16 seconds with FWW  Current: goal progressing; pt demos improved gait quality after cueing, noting good stretch of the right hip flexor upon terminal stance of gait cycle (12/21/23)  - Goal: Patient will increase FOTO score to at least 67 pts to demonstrate increased functional mobility.   Status at last note/certification: FOTO 44 pts     Next PN/ RC due 1/17/23  Auth due    [id number], PTA    12/21/2023      Future

## 2023-12-26 ENCOUNTER — HOSPITAL ENCOUNTER (OUTPATIENT)
Facility: HOSPITAL | Age: 63
Setting detail: RECURRING SERIES
Discharge: HOME | End: 2023-12-29
Payer: MEDICARE

## 2023-12-26 PROCEDURE — 97110 THERAPEUTIC EXERCISES: CPT

## 2023-12-26 PROCEDURE — 97530 THERAPEUTIC ACTIVITIES: CPT

## 2023-12-26 PROCEDURE — 97140 MANUAL THERAPY 1/> REGIONS: CPT

## 2023-12-26 PROCEDURE — 97116 GAIT TRAINING THERAPY: CPT

## 2023-12-26 NOTE — PROGRESS NOTES
movement with household/community ambulation. (see flow sheet as applicable)     Details if applicable: with FWRW and 1/4 inch heel lift      21 21   07624 Therapeutic Activity (timed):  use of dynamic activities replicating functional movements to increase ROM, strength, coordination, balance, and proprioception in order to improve patient's ability to progress to PLOF and address remaining functional goals. (see flow sheet as applicable)    Details if applicable:  step ups, sit to stands, transfers, patient education components    60 48 [facility] Totals Reminder: bill using total billable min of TIMED therapeutic procedures (example: do not include dry needle or estim unattended, both untimed codes, in totals to left)  8-22 min = 1 unit; 23-37 min = 2 units; 38-52 min = 3 units; 53-67 min = 4 units; 68-82 min = 5 units   Total Total     [x]  Patient Education billed concurrently with other procedures   [x] Review HEP    [] Progressed/Changed HEP, detail:    [] Other detail:       Objective Information/Functional Measures/Assessment  Patient responded positively to 1/4 inch heel lift in right shoe reporting significant reduction in knee pain and improved weight bearing tolerance   Cued with gait throughout session for posture, heel strike, and step length with good carryover  Added sit to stands for functional carryover (cued for symmetrical foot placement)     Patient will continue to benefit from skilled PT / OT services to modify and progress therapeutic interventions, analyze and address functional mobility deficits, analyze and address ROM deficits, analyze and address strength deficits, analyze and address soft tissue restrictions, analyze and cue for proper movement patterns, analyze and modify for postural abnormalities, analyze and address imbalance/dizziness, and instruct in home and community integration to address functional deficits and attain remaining goals.     Progress toward goals / Updated goals:

## 2023-12-27 ENCOUNTER — HOSPITAL ENCOUNTER (OUTPATIENT)
Facility: HOSPITAL | Age: 63
Setting detail: RECURRING SERIES
Discharge: HOME | End: 2023-12-30
Payer: MEDICARE

## 2023-12-27 PROCEDURE — 97530 THERAPEUTIC ACTIVITIES: CPT

## 2023-12-27 PROCEDURE — 97140 MANUAL THERAPY 1/> REGIONS: CPT

## 2023-12-27 PROCEDURE — 97110 THERAPEUTIC EXERCISES: CPT

## 2023-12-27 NOTE — PROGRESS NOTES
PHYSICAL / OCCUPATIONAL THERAPY - DAILY TREATMENT NOTE (updated )    Patient Name: Christina Porter    Date: 2023    : 1960  Insurance: Payor: Preetdonteees Hitjohnson / Plan: Valeria Santosjohnson / Product Type: *No Product type* /      Patient  verified Yes     Visit #   Current / Total 4 10   Time   In / Out 2:20 pm 3:22 pm   Pain   In / Out 5/10 2/10   Subjective Functional Status/Changes: \"The heel lift has made a big difference. \"     TREATMENT AREA =  Right hip pain [M25.551]    OBJECTIVE    Modalities Rationale:     decrease pain to improve patient's ability to progress to PLOF and address remaining functional goals. 10 min  unbill [x]  Ice     []  Heat    location/position: (R) hip and lower LE; patient in supine with LE elevated on wedge   Skin assessment post-treatment:   Intact      Therapeutic Procedures: Tx Min   Billable or 1:1 Min (if diff from Tx Min)   Procedure, Rationale, Specifics   24 14 38815 Therapeutic Exercise (timed):  increase ROM, strength, coordination, balance, and proprioception to improve patient's ability to progress to PLOF and address remaining functional goals. (see flow sheet as applicable)     Details if applicable:  nu-step, stretching, strengthening      12 12 71802 Manual Therapy (timed):  decrease pain, increase ROM, and increase tissue extensibility to improve patient's ability to progress to PLOF and address remaining functional goals. The manual therapy interventions were performed at a separate and distinct time from the therapeutic activities interventions .  (see flow sheet as applicable)     Details if applicable:  STM to quadriceps, iliopsoas, adductors, hamstring; right hip PROM/passive stretching into flexion, long lever abduction, and extension with the knee flexed     3 3 46284 Gait Training (timed):    1 x 40 feet with FWW (assistive device) over level surfaces with SBA level of assist. Cuing for heel to toe patterning, for knee flexion at mid-stance,

## 2024-01-02 ENCOUNTER — HOSPITAL ENCOUNTER (OUTPATIENT)
Facility: HOSPITAL | Age: 64
Setting detail: RECURRING SERIES
Discharge: HOME | End: 2024-01-05
Payer: MEDICARE

## 2024-01-02 PROCEDURE — 97110 THERAPEUTIC EXERCISES: CPT

## 2024-01-02 PROCEDURE — 97140 MANUAL THERAPY 1/> REGIONS: CPT

## 2024-01-02 NOTE — PROGRESS NOTES
(assistive device) over level surfaces with SBA level of assist. Cuing for heel to toe patterning, for knee flexion at mid-stance, TKE for terminal stance of gait cycle.  To improve safety and dynamic movement with household/community ambulation.  (see flow sheet as applicable)     Details if applicable: with FWRW and 1/4 inch heel lift      40 38 [facility] Totals Reminder: bill using total billable min of TIMED therapeutic procedures (example: do not include dry needle or estim unattended, both untimed codes, in totals to left)  8-22 min = 1 unit; 23-37 min = 2 units; 38-52 min = 3 units; 53-67 min = 4 units; 68-82 min = 5 units   Total Total     [x]  Patient Education billed concurrently with other procedures   [x] Review HEP    [x] Progressed/Changed HEP, detail: butterfly groin stretch with or without use of pillow support; S/L clam or hip abduction  [] Other detail:       Objective Information/Functional Measures/Assessment  Patient appears to demonstrate (+) response to use of right heel lift to address c/o right knee joint line and peripatellar pain. Patient has full passive flexion as demonstrated by her ability to don shoes with the hip in full flexion. Most notable stiffness into abduction (0 deg) and extension (approx -5 deg).    Patient will continue to benefit from skilled PT / OT services to modify and progress therapeutic interventions, analyze and address functional mobility deficits, analyze and address ROM deficits, analyze and address strength deficits, analyze and address soft tissue restrictions, analyze and cue for proper movement patterns, analyze and modify for postural abnormalities, analyze and address imbalance/dizziness, and instruct in home and community integration to address functional deficits and attain remaining goals.    Progress toward goals / Updated goals:  []  See Progress Note/Recertification  Goals:  Short Term Goals: To be accomplished in 4 weeks  - Goal: Pt to be compliant with

## 2024-01-04 ENCOUNTER — HOSPITAL ENCOUNTER (OUTPATIENT)
Facility: HOSPITAL | Age: 64
Setting detail: RECURRING SERIES
Discharge: HOME | End: 2024-01-07
Payer: MEDICARE

## 2024-01-04 PROCEDURE — 97110 THERAPEUTIC EXERCISES: CPT

## 2024-01-04 PROCEDURE — 97116 GAIT TRAINING THERAPY: CPT

## 2024-01-04 PROCEDURE — 97112 NEUROMUSCULAR REEDUCATION: CPT

## 2024-01-04 NOTE — PROGRESS NOTES
1/17/23  Auth due    PLAN  [x] Continue plan of care  [x]  Upgrade activities as tolerated  []  Discharge due to :  []  Other:    Candido Dwyer PT    1/4/2024    7:55 AM    Future Appointments   Date Time Provider Department Center   1/4/2024  1:40 PM Candido Dwyer, PT [facility]   1/5/2024 11:40 AM Candido Dwyer, PT [facility]   1/8/2024  1:40 PM Candido Dwyer, PT [facility]   1/10/2024  1:40 PM Candido Dwyer, PT [facility]   1/12/2024  1:40 PM Candido Dwyer, PT [facility]   1/15/2024  1:40 PM Candido Dwyer, PT [facility]   1/17/2024 10:20 AM Candido Dwyer, PT [facility]   1/19/2024  1:40 PM Candido Dwyer, PT [facility]   1/22/2024  1:40 PM Candido Dwyer, PT [facility]   1/24/2024  1:40 PM Candido Dwyer, PT [facility]   1/26/2024  1:40 PM Candido Dwyer, PT [facility]   1/29/2024  1:40 PM Jose Luis Parkinson, PTA [facility]   1/31/2024  1:40 PM Candido Dwyer, PT [facility]

## 2024-01-05 ENCOUNTER — HOSPITAL ENCOUNTER (OUTPATIENT)
Facility: HOSPITAL | Age: 64
Setting detail: RECURRING SERIES
Discharge: HOME | End: 2024-01-08
Payer: MEDICARE

## 2024-01-05 PROCEDURE — 97112 NEUROMUSCULAR REEDUCATION: CPT

## 2024-01-05 PROCEDURE — 97110 THERAPEUTIC EXERCISES: CPT

## 2024-01-05 NOTE — PROGRESS NOTES
PHYSICAL / OCCUPATIONAL THERAPY - DAILY TREATMENT NOTE (updated )    Patient Name: Valeria Dudley    Date: 2024    : 1960  Insurance: Payor: [employer] / Plan: [employer] / Product Type: *No Product type* /      Patient  verified yes     Visit #   Current / Total 7 10 Total Time   Time   In / Out 11:41 12:32 51   Pain   In / Out 4-5 3-4    Subjective Functional Status/Changes: I am feeling sore after yesterday's session     TREATMENT AREA =  Right hip pain [M25.551]    OBJECTIVE    Therapeutic Procedures:  41  Total 41  Total  BC Totals Reminder: bill using total billable min of TIMED therapeutic procedures (example: do not include dry needle or estim unattended, both untimed codes, in totals to left)  8-22 min = 1 unit; 23-37 min = 2 units; 38-52 min = 3 units; 53-67 min = 4 units; 68-82 min = 5 units   Tx Min Billable or 1:1 Min (if diff from Tx Min) Procedure, Rationale, Specifics   15  54930 Therapeutic Exercise (timed):  increase ROM, strength, coordination, balance, and proprioception to improve patient's ability to progress to PLOF and address remaining functional goals. (see flow sheet as applicable)   Details if applicable:       26  57219 Neuromuscular Re-Education (timed):  improve balance, coordination, kinesthetic sense, posture, core stability and proprioception to improve patient's ability to develop conscious control of individual muscles and awareness of position of extremities in order to progress to PLOF and address remaining functional goals. (see flow sheet as applicable)     Details if applicable: glute re-ed           [x]  Patient Education billed concurrently with other procedures   [x] Review HEP    [] Progressed/Changed HEP, detail:    [] Other detail:       Objective Information/Functional Measures/Assessment    -Increased anterior hip pain reported with supine hip flexor stretching. Alleviated with isometric march from hip extended position to neutral

## 2024-01-08 ENCOUNTER — APPOINTMENT (OUTPATIENT)
Facility: HOSPITAL | Age: 64
End: 2024-01-08
Payer: MEDICARE

## 2024-01-10 ENCOUNTER — APPOINTMENT (OUTPATIENT)
Facility: HOSPITAL | Age: 64
End: 2024-01-10
Payer: MEDICARE

## 2024-01-12 ENCOUNTER — APPOINTMENT (OUTPATIENT)
Facility: HOSPITAL | Age: 64
End: 2024-01-12
Payer: MEDICARE

## 2024-01-15 ENCOUNTER — HOSPITAL ENCOUNTER (OUTPATIENT)
Facility: HOSPITAL | Age: 64
Setting detail: RECURRING SERIES
Discharge: HOME | End: 2024-01-18
Payer: MEDICARE

## 2024-01-15 ENCOUNTER — APPOINTMENT (OUTPATIENT)
Facility: HOSPITAL | Age: 64
End: 2024-01-15
Payer: MEDICARE

## 2024-01-15 PROCEDURE — 97112 NEUROMUSCULAR REEDUCATION: CPT

## 2024-01-15 PROCEDURE — 97110 THERAPEUTIC EXERCISES: CPT

## 2024-01-15 PROCEDURE — 97116 GAIT TRAINING THERAPY: CPT

## 2024-01-15 NOTE — PROGRESS NOTES
reports compliance (1/4/24)  - Goal: Pt to demo right knee AROM of 0-130 deg to improve ease of gait.  Status at last note/certification: lacking 6 deg extension  Current:   - Goal: Pt to demo right hip ER AROM of at least 30 deg to improve ease of donning/doffing shoes.  Status at last note/certification: 14 deg  Current:   - Goal: Pt to demo supine right hip flexion AROM to 110 deg to improve ease with gait/bed mobility.  Status at last note/certification: 97 deg  Current: progressing, right hip flexion AAROM 105 deg (1/5/24)     Long Term Goals: To be accomplished in 8-12 weeks  - Goal: Pt to demo 5/5 right hip abduction MMT to improve ease with gait.  Status at last note/certification: 3-/5  Current: progressing, 4-/5 (1/15/24)  - Goal: Pt to demo 5/5 right hip extenson MMT to improve ease with gait.  Status at last note/certification: 3-/5  Current: remains 3-/5 (1/5/24)  - Goal: Pt to negotiate at least 16 steps with reciprocal pattern to improve ease with access to apartment.  Status at last note/certification: 4 steps with step to pattern  - Goal: Pt will perform TUG Test in 12 seconds or less without AD to demo improved dynamic balance within the home and demo decreased risk for falls.  Status at last note/certification: 16 seconds with FWW  Current: goal progressing; pt demos improved gait quality after cueing, noting good stretch of the right hip flexor upon terminal stance of gait cycle (12/21/23)  - Goal: Patient will increase FOTO score to at least 67 pts to demonstrate increased functional mobility.  Status at last note/certification: FOTO 44 pts      Next PN/ RC due 1/17/23    PLAN  [x] Continue plan of care  [x]  Upgrade activities as tolerated  []  Discharge due to :  []  Other:    Candido Dwyer PT    1/15/2024    9:22 AM    Future Appointments   Date Time Provider Department Center   1/15/2024  1:40 PM Candido Dwyer PT [facility]   1/17/2024 10:20 [facility]   1/19/2024

## 2024-01-17 ENCOUNTER — HOSPITAL ENCOUNTER (OUTPATIENT)
Facility: HOSPITAL | Age: 64
Setting detail: RECURRING SERIES
Discharge: HOME | End: 2024-01-20
Payer: MEDICARE

## 2024-01-17 PROCEDURE — 97140 MANUAL THERAPY 1/> REGIONS: CPT

## 2024-01-17 PROCEDURE — 97110 THERAPEUTIC EXERCISES: CPT

## 2024-01-17 NOTE — PROGRESS NOTES
PHYSICAL / OCCUPATIONAL THERAPY - DAILY TREATMENT NOTE (updated )    Patient Name: Valeria Dudley    Date: 2024    : 1960  Insurance: Payor: HOLLILUIS ALBERTO MEDICARE / [facility] / Product Type: *No Product type* /      Patient  verified yes     Visit #   Current / Total 9 10 Total Time   Time   In / Out 11:40 am 12:28 pm 48   Pain   In / Out 0/10 0/10    Subjective Functional Status/Changes: Patient reports finally feeling strong after resting the past week due to illness.     TREATMENT AREA =  Right hip pain [M25.551]    OBJECTIVE  Therapeutic Procedures:  48  Total 43  Total  BC Totals Reminder: bill using total billable min of TIMED therapeutic procedures (example: do not include dry needle or estim unattended, both untimed codes, in totals to left)  8-22 min = 1 unit; 23-37 min = 2 units; 38-52 min = 3 units; 53-67 min = 4 units; 68-82 min = 5 units   Tx Min Billable or 1:1 Min (if diff from Tx Min) Procedure, Rationale, Specifics   37 32 37084 Therapeutic Exercise (timed):  increase ROM, strength, coordination, balance, and proprioception to improve patient's ability to progress to PLOF and address remaining functional goals. (see flow sheet as applicable)     Details if applicable:  reassessment     0 0 93457 Neuromuscular Re-Education (timed):  improve balance, coordination, kinesthetic sense, posture, core stability and proprioception to improve patient's ability to develop conscious control of individual muscles and awareness of position of extremities in order to progress to PLOF and address remaining functional goals. (see flow sheet as applicable)     Details if applicable:       3 3 01876 Gait Training (timed):    4 x 40 feet with FWW (assistive device) over level surfaces with S level of assist. To improve safety and dynamic movement with household/community ambulation.  (see flow sheet as applicable)     Gait assessment with use of FWW     8 8 88726 Manual 
Left   Hip     Flexion in standard testing position     (with contralateral knee flexed) 85      (113) 105      (120)             Abduction 7   22             Adduction  not tested not tested              Extension -15 -5             ER 12 35             IR 16 16                                            Knee AROM:  (R) 7-130 deg (improved to 2-130 after LE traction hold)  (L) 2-135 deg     Strength:    Right (/5) Left (/5)   Hip     Flexion 4 5             Abduction 4-       Gross Adduction 5 5             Extension <3 <3             ER <3 5             IR <3 5   Knee   Extension 5 5              Flexion  (tested in prone) 4- 5   Ankle   Dorsiflexion 5 5      Gait:   Gait characterized by following: patient using right heel lift     Antalgic?         [x] Right                       [] Left  Pattern ?          [] Step To                  [x] Step Through, Trendelenburg    AD?                 [x] Yes, FWW                         [] No  Candace?        [] Continuous             [x] Discontinuous, delayed (R) initial swing  Step Length     [x] Symmetrical           [] Asymmetrical     Initial Contact    [x] Adequate Heel Strike        [x] Absent knee flexion at loading response     Mid Stance  [] Normal       [] Poor Knee Extension    [] Knee Hyperextension  [x] Trendelenberg       [] Compensated   [x] Uncompensated     Late Stance   [] Normal       [x] Absent/Decreased Terminal Hip Extension (hip maintained in approximately 20-30 degrees of flexion during terminal stance)     Functional Squat: to 20 inch height table, (I), normal movement pattern     Stair Negotiation: step-over-step pattern ascending, (B) UE assistance, step-over-step descending, with right anterior thigh pain upon descending     Balance: TUG Test: 14 seconds with FWW        Progress toward goals / Updated goals:   Short Term Goals: To be accomplished in 4 weeks  - Goal: Pt to be compliant with initial HEP to improve ability to independently address

## 2024-01-19 ENCOUNTER — HOSPITAL ENCOUNTER (OUTPATIENT)
Facility: HOSPITAL | Age: 64
Setting detail: RECURRING SERIES
Discharge: HOME | End: 2024-01-22
Payer: MEDICARE

## 2024-01-19 PROCEDURE — 97110 THERAPEUTIC EXERCISES: CPT

## 2024-01-19 PROCEDURE — 97112 NEUROMUSCULAR REEDUCATION: CPT

## 2024-01-19 PROCEDURE — 97530 THERAPEUTIC ACTIVITIES: CPT

## 2024-01-19 NOTE — PROGRESS NOTES
(timed):  improve balance, coordination, kinesthetic sense, posture, core stability and proprioception to improve patient's ability to develop conscious control of individual muscles and awareness of position of extremities in order to progress to PLOF and address remaining functional goals. (see flow sheet as applicable)     Details if applicable:           [x]  Patient Education billed concurrently with other procedures   [x] Review HEP    [] Progressed/Changed HEP, detail:    [] Other detail:       Objective Information/Functional Measures/Assessment    - Added pillow under right thigh during prone heel squeeze to address patient's deficits with engaging right gluteal musculature in increased hip extended position. Cont to  address this deficit by adding in quadruped hip extension. Pt demonstrates improved ability to perform prone hip extension following re-education in quadruped.  - The patient requires skilled verbal/visual cuing to implement correct technique with newly added interventions, specifically to maintain neutral lumbar spine and encourage glute activation with quad kick back..       Patient will continue to benefit from skilled PT / OT services to modify and progress therapeutic interventions, analyze and address functional mobility deficits, analyze and address ROM deficits, analyze and address strength deficits, analyze and address soft tissue restrictions, analyze and cue for proper movement patterns, analyze and modify for postural abnormalities, analyze and address imbalance/dizziness, and instruct in home and community integration to address functional deficits and attain remaining goals.    Progress toward goals / Updated goals:  []  See Progress Note/Recertification    - Goal: Pt to demo right knee AROM of 0-130 deg to improve ease of gait.  Status at last note/certification: lacking 7 degrees extension upon arrival improved to 2 degrees following manual interventions  - Goal: Pt to demo

## 2024-01-22 ENCOUNTER — HOSPITAL ENCOUNTER (OUTPATIENT)
Facility: HOSPITAL | Age: 64
Setting detail: RECURRING SERIES
Discharge: HOME | End: 2024-01-25
Payer: MEDICARE

## 2024-01-22 PROCEDURE — 97112 NEUROMUSCULAR REEDUCATION: CPT

## 2024-01-22 PROCEDURE — 97530 THERAPEUTIC ACTIVITIES: CPT

## 2024-01-22 NOTE — PROGRESS NOTES
Re-Education (timed):  improve balance, coordination, kinesthetic sense, posture, core stability and proprioception to improve patient's ability to develop conscious control of individual muscles and awareness of position of extremities in order to progress to PLOF and address remaining functional goals. (see flow sheet as applicable)     Details if applicable:           [x]  Patient Education billed concurrently with other procedures   [x] Review HEP    [] Progressed/Changed HEP, detail:    [] Other detail:       Objective Information/Functional Measures/Assessment    - The patient requires maximal verbal cuing and visual demonstration to learn correct technique with all newly introduced therapy interventions in bars.  - Home exercise program reviewed for clarity. Pt enjoys most recent update for hip strength/mobility  - The pt continues to benefit from tactile block at right lateral hip for improved stability during single leg stance such as when performing cisco taps. This decreases lateral hip pain today.    Patient will continue to benefit from skilled PT / OT services to modify and progress therapeutic interventions, analyze and address functional mobility deficits, analyze and address ROM deficits, analyze and address strength deficits, analyze and address soft tissue restrictions, analyze and cue for proper movement patterns, analyze and modify for postural abnormalities, analyze and address imbalance/dizziness, and instruct in home and community integration to address functional deficits and attain remaining goals.    Progress toward goals / Updated goals:  []  See Progress Note/Recertification    - Goal: Pt to demo right knee AROM of 0-130 deg to improve ease of gait.  Status at last note/certification: lacking 7 degrees extension upon arrival improved to 2 degrees following manual interventions  - Goal: Pt to demo right hip ER AROM of at least 30 deg to improve ease of donning/doffing shoes.  Status at

## 2024-01-24 ENCOUNTER — APPOINTMENT (OUTPATIENT)
Facility: HOSPITAL | Age: 64
End: 2024-01-24
Payer: MEDICARE

## 2024-01-26 ENCOUNTER — HOSPITAL ENCOUNTER (OUTPATIENT)
Facility: HOSPITAL | Age: 64
Setting detail: RECURRING SERIES
Discharge: HOME | End: 2024-01-29
Payer: MEDICARE

## 2024-01-26 PROCEDURE — 97112 NEUROMUSCULAR REEDUCATION: CPT

## 2024-01-26 PROCEDURE — 97110 THERAPEUTIC EXERCISES: CPT

## 2024-01-26 PROCEDURE — 97140 MANUAL THERAPY 1/> REGIONS: CPT

## 2024-01-26 NOTE — PROGRESS NOTES
PHYSICAL / OCCUPATIONAL THERAPY - DAILY TREATMENT NOTE (updated )    Patient Name: Valeria Dudley    Date: 2024    : 1960  Insurance: Payor: [employer] / Plan: [employer] MEDICARE PRIME (HMO) / Product Type: *No Product type* /      Patient  verified yes     Visit #   Current / Total 3 10 Total Time   Time   In / Out 1:40 pm 2:29 pm 49   Pain   In / Out 5/10 1/10    Subjective Functional Status/Changes: \"I am still sore from last time. I haven't been sleeping well because it's been so uncomfortable. It shows that I need this because it's so weak.\"     TREATMENT AREA =  Right hip pain [M25.551]    OBJECTIVE    Modalities Rationale:     decrease pain and decrease edema to improve patient's ability to progress to PLOF and address remaining functional goals.             --  10 min    []  Ice     [x]  Heat    location/position: Right hip, PRONE   Skin assessment post-treatment (if applicable):    [x]  intact    []  redness- no adverse reaction                 []redness - adverse reaction:         Therapeutic Procedures:  39  Total 39  Total [facility] Totals Reminder: bill using total billable min of TIMED therapeutic procedures (example: do not include dry needle or estim unattended, both untimed codes, in totals to left)  8-22 min = 1 unit; 23-37 min = 2 units; 38-52 min = 3 units; 53-67 min = 4 units; 68-82 min = 5 units   Tx Min Billable or 1:1 Min (if diff from Tx Min) Procedure, Rationale, Specifics   9 9 41099 Therapeutic Exercise (timed):  increase ROM, strength, coordination, balance, and proprioception to improve patient's ability to progress to PLOF and address remaining functional goals. (see flow sheet as applicable)     Details if applicable:       8  37571 Therapeutic Activity (timed):  use of dynamic activities replicating functional movements to increase ROM, strength, coordination, balance, and proprioception in order to improve patient's ability to progress to PLOF and address

## 2024-01-29 ENCOUNTER — HOSPITAL ENCOUNTER (OUTPATIENT)
Facility: HOSPITAL | Age: 64
Setting detail: RECURRING SERIES
Discharge: HOME | End: 2024-02-01
Payer: MEDICARE

## 2024-01-29 PROCEDURE — 97140 MANUAL THERAPY 1/> REGIONS: CPT

## 2024-01-29 PROCEDURE — 97112 NEUROMUSCULAR REEDUCATION: CPT

## 2024-01-29 PROCEDURE — 97110 THERAPEUTIC EXERCISES: CPT

## 2024-01-29 NOTE — PROGRESS NOTES
AROM of 0-130 deg to improve ease of gait.  Status at last note/certification: lacking 7 degrees extension upon arrival improved to 2 degrees following manual interventions  - Goal: Pt to demo right hip ER AROM of at least 30 deg to improve ease of donning/doffing shoes.  Status at last note/certification: 12 deg  - Goal: Pt to demo supine right hip flexion AROM to 110 deg to improve ease with gait/bed mobility.  Status at last note/certification:  85 degrees with contralateral knee extended, 113 degrees with contralateral knee flexed - near full passive hip flexion ROM with child's pose QP rocking (1/29/24)  - Goal: Pt to demo 5/5 right hip abduction MMT to improve ease with gait.  Status at last note/certification: 4-/5 (1/15/24)  Current: goal progressing; added fire hydrant to progress to goal (1/26/24)  - Goal: Pt to demo 5/5 right hip extenson MMT to improve ease with gait.  Status at last note/certification: 3-/5 (1/5/24)  Current: addressing with quadruped hip extension (1/19/24)  - Goal: Pt to negotiate at least 16 steps with reciprocal pattern to improve ease with access to apartment.  Status at last note/certification: 4 steps with step to pattern  - Goal: Pt will perform TUG Test in 12 seconds or less without AD to demo improved dynamic balance within the home and demo decreased risk for falls.  Status at last note/certification: 16 seconds with FWW  - Goal: Patient will increase FOTO score to at least 67 pts to demonstrate increased functional mobility.  Status at last note/certification  FOTO 47 pts (1/17/24)    PLAN  [x] Continue plan of care  [x]  Upgrade activities as tolerated  []  Discharge due to :  [x]  Other: consider quadriped interventions to tall-kneel then half-kneel when able; then functional movement patterns    Jose Luis Parkinson, SUKI    1/29/2024    Future Appointments   Date Time Provider Department Center   2/1/2024  2:20 PM [facility]   2/5/2024 12:20 PM [place]

## 2024-01-31 ENCOUNTER — APPOINTMENT (OUTPATIENT)
Facility: HOSPITAL | Age: 64
End: 2024-01-31
Payer: MEDICARE

## 2024-02-01 ENCOUNTER — HOSPITAL ENCOUNTER (OUTPATIENT)
Facility: HOSPITAL | Age: 64
Setting detail: RECURRING SERIES
Discharge: HOME | End: 2024-02-04
Payer: MEDICARE

## 2024-02-01 PROCEDURE — 97112 NEUROMUSCULAR REEDUCATION: CPT

## 2024-02-01 PROCEDURE — 97140 MANUAL THERAPY 1/> REGIONS: CPT

## 2024-02-01 NOTE — PROGRESS NOTES
PHYSICAL / OCCUPATIONAL THERAPY - DAILY TREATMENT NOTE (updated )    Patient Name: Valeria Dudley    Date: 2024    : 1960  Insurance: Payor: [employer]IC[employer] / Plan: [employer] MEDICARE PRIME (HMO) / Product Type: *No Product type* /      Patient  verified yes     Visit #   Current / Total 5 10 Total Time   Time   In / Out 2:21 3:14 53   Pain   In / Out 0 0    Subjective Functional Status/Changes: It is still feeling sore in my muscles around the hip.     TREATMENT AREA =  Right hip pain [M25.551]    OBJECTIVE    Modalities Rationale:     decrease pain and decrease edema to improve patient's ability to progress to PLOF and address remaining functional goals.             --  10 min    []  Ice     [x]  Heat    location/position: Right hip, supine   Skin assessment post-treatment (if applicable):    [x]  intact    []  redness- no adverse reaction                 []redness - adverse reaction:           Therapeutic Procedures:  43  Total 43  Total [facility] Totals Reminder: bill using total billable min of TIMED therapeutic procedures (example: do not include dry needle or estim unattended, both untimed codes, in totals to left)  8-22 min = 1 unit; 23-37 min = 2 units; 38-52 min = 3 units; 53-67 min = 4 units; 68-82 min = 5 units   Tx Min Billable or 1:1 Min (if diff from Tx Min) Procedure, Rationale, Specifics   5 5 13196 Therapeutic Exercise (timed):  increase ROM, strength, coordination, balance, and proprioception to improve patient's ability to progress to PLOF and address remaining functional goals. (see flow sheet as applicable)   Details if applicable:       28 28 37035 Neuromuscular Re-Education (timed):  improve balance, coordination, kinesthetic sense, posture, core stability and proprioception to improve patient's ability to develop conscious control of individual muscles and awareness of position of extremities in order to progress to PLOF and address remaining functional goals. (see flow sheet

## 2024-02-05 ENCOUNTER — HOSPITAL ENCOUNTER (OUTPATIENT)
Facility: HOSPITAL | Age: 64
Setting detail: RECURRING SERIES
Discharge: HOME | End: 2024-02-08
Payer: MEDICARE

## 2024-02-05 PROCEDURE — 97110 THERAPEUTIC EXERCISES: CPT

## 2024-02-05 PROCEDURE — 97112 NEUROMUSCULAR REEDUCATION: CPT

## 2024-02-05 NOTE — PROGRESS NOTES
PHYSICAL / OCCUPATIONAL THERAPY - DAILY TREATMENT NOTE (updated )    Patient Name: Valeria Dudley    Date: 2024    : 1960  Insurance: Payor: [employer] MEDICARE / Plan: [employer] MEDICARE PRIME (HMO) / Product Type: *No Product type* /      Patient  verified yes     Visit #   Current / Total 6 10 Total Time   Time   In / Out 12:20 1:20 60   Pain   In / Out 0 0    Subjective Functional Status/Changes: I have noticed changes in my pain tolerance since this injury, it is very low.     TREATMENT AREA =  Right hip pain [M25.551]    OBJECTIVE    Modalities Rationale:     decrease pain and decrease edema to improve patient's ability to progress to PLOF and address remaining functional goals.             --  10 min    []  Ice     [x]  Heat    location/position: Right hip, supine   Skin assessment post-treatment (if applicable):    [x]  intact    []  redness- no adverse reaction                 []redness - adverse reaction:           Therapeutic Procedures:  50  Total 40  Total [facility] Totals Reminder: bill using total billable min of TIMED therapeutic procedures (example: do not include dry needle or estim unattended, both untimed codes, in totals to left)  8-22 min = 1 unit; 23-37 min = 2 units; 38-52 min = 3 units; 53-67 min = 4 units; 68-82 min = 5 units   Tx Min Billable or 1:1 Min (if diff from Tx Min) Procedure, Rationale, Specifics   15 10 82001 Therapeutic Exercise (timed):  increase ROM, strength, coordination, balance, and proprioception to improve patient's ability to progress to PLOF and address remaining functional goals. (see flow sheet as applicable)   Details if applicable:       35 40 51688 Neuromuscular Re-Education (timed):  improve balance, coordination, kinesthetic sense, posture, core stability and proprioception to improve patient's ability to develop conscious control of individual muscles and awareness of position of extremities in order to progress to PLOF and address remaining

## 2024-02-08 ENCOUNTER — HOSPITAL ENCOUNTER (OUTPATIENT)
Facility: HOSPITAL | Age: 64
Setting detail: RECURRING SERIES
Discharge: HOME | End: 2024-02-11
Payer: MEDICARE

## 2024-02-08 PROCEDURE — 97112 NEUROMUSCULAR REEDUCATION: CPT

## 2024-02-08 PROCEDURE — 97110 THERAPEUTIC EXERCISES: CPT

## 2024-02-08 PROCEDURE — 97530 THERAPEUTIC ACTIVITIES: CPT

## 2024-02-08 NOTE — PROGRESS NOTES
PHYSICAL / OCCUPATIONAL THERAPY - DAILY TREATMENT NOTE (updated )    Patient Name: Valeria Dudley    Date: 2024    : 1960  Insurance: Payor: [employer] MEDICARE / Plan: F[employer] MEDICARE PRIME (HMO) / Product Type: *No Product type* /      Patient  verified yes     Visit #   Current / Total 7 10 Total Time   Time   In / Out 1:41 2:34 53   Pain   In / Out 0 0    Subjective Functional Status/Changes: I had a mental shift in regards to my recovery over the weekend. I have to work through some of the pain since      TREATMENT AREA =  Right hip pain [M25.551]    OBJECTIVE    Modalities Rationale:     decrease pain and decrease edema to improve patient's ability to progress to PLOF and address remaining functional goals.             --  10 min    []  Ice     [x]  Heat    location/position: Right hip, supine   Skin assessment post-treatment (if applicable):    [x]  intact    []  redness- no adverse reaction                 []redness - adverse reaction:           Therapeutic Procedures:  43  Total 40  Total [facility] Totals Reminder: bill using total billable min of TIMED therapeutic procedures (example: do not include dry needle or estim unattended, both untimed codes, in totals to left)  8-22 min = 1 unit; 23-37 min = 2 units; 38-52 min = 3 units; 53-67 min = 4 units; 68-82 min = 5 units   Tx Min Billable or 1:1 Min (if diff from Tx Min) Procedure, Rationale, Specifics   13 63 97120 Therapeutic Exercise (timed):  increase ROM, strength, coordination, balance, and proprioception to improve patient's ability to progress to PLOF and address remaining functional goals. (see flow sheet as applicable)   Details if applicable:       10 10 79623 Therapeutic Activity (timed):  use of dynamic activities replicating functional movements to increase ROM, strength, coordination, balance, and proprioception in order to improve patient's ability to progress to PLOF and address remaining functional goals.  (see flow sheet as

## 2024-02-12 ENCOUNTER — HOSPITAL ENCOUNTER (OUTPATIENT)
Facility: HOSPITAL | Age: 64
Setting detail: RECURRING SERIES
Discharge: HOME | End: 2024-02-15
Payer: MEDICARE

## 2024-02-12 PROCEDURE — 97110 THERAPEUTIC EXERCISES: CPT

## 2024-02-12 PROCEDURE — 97112 NEUROMUSCULAR REEDUCATION: CPT

## 2024-02-12 PROCEDURE — 97530 THERAPEUTIC ACTIVITIES: CPT

## 2024-02-12 NOTE — PROGRESS NOTES
AROM to 110 deg to improve ease with gait/bed mobility.  Status at last note/certification:  85 degrees with contralateral knee extended, 113 degrees with contralateral knee flexed -  Current: near full passive hip flexion ROM with child's pose QP rocking (1/29/24)  - Goal: Pt to demo 5/5 right hip abduction MMT to improve ease with gait.  Status at last note/certification: 4-/5 (1/15/24)  Current: goal progressing; added fire hydrant to progress to goal (1/26/24)  - Goal: Pt to demo 5/5 right hip extenson MMT to improve ease with gait.  Status at last note/certification: 3-/5   Current: addressing with quadruped hip extension (1/19/24)  - Goal: Pt to negotiate at least 16 steps with reciprocal pattern to improve ease with access to apartment.  Status at last note/certification: 4 steps with step to pattern  Current: able to ascend 6\" steps with reciprocal pattern with B UE assist (2/12/24)  - Goal: Pt will perform TUG Test in 12 seconds or less without AD to demo improved dynamic balance within the home and demo decreased risk for falls.  Status at last note/certification: 16 seconds with FWW  Current:   - Goal: Patient will increase FOTO score to at least 67 pts to demonstrate increased functional mobility.  Status at last note/certification  FOTO 47 pts   Current:   PN due 2/17/24  RC due 3/17/24  Auth due 3/31/24    PLAN  [x] Continue plan of care  [x]  Upgrade activities as tolerated  []  Discharge due to :  []  Other:    Candido Dwyer PT    2/12/2024    7:58 AM    Future Appointments   Date Time Provider Department Center   2/12/2024 12:20 PM Candido Dwyer PT [facility]   2/16/2024  1:40 PM Jose Luis Parkinson PTA [facility]   2/19/2024 11:40 AM Jose Luis Parkinson PTA [facility]   2/23/2024  1:00 PM Candido Dwyer PT [facility]   2/26/2024  1:40 PM Jose Luis Parkinson PTA [facility]   3/1/2024 11:00 AM Jose Luis Parkinson PTA [facility]

## 2024-02-16 ENCOUNTER — HOSPITAL ENCOUNTER (OUTPATIENT)
Facility: HOSPITAL | Age: 64
Setting detail: RECURRING SERIES
Discharge: HOME | End: 2024-02-19
Payer: MEDICARE

## 2024-02-16 PROCEDURE — 97110 THERAPEUTIC EXERCISES: CPT

## 2024-02-16 PROCEDURE — 97140 MANUAL THERAPY 1/> REGIONS: CPT

## 2024-02-16 NOTE — PROGRESS NOTES
PHYSICAL / OCCUPATIONAL THERAPY - DAILY TREATMENT NOTE (updated )    Patient Name: Valeria Dudley    Date: 2024    : 1960  Insurance: Payor: [employer] MEDICARE / Plan: [employer] MEDICARE PRIME (HMO) / Product Type: *No Product type* /      Patient  verified yes     Visit #   Current / Total 9 10 Total Time   Time   In / Out 1:40 pm 2:45 pm 65   Pain   In / Out 2/10 0/10    Subjective Functional Status/Changes: \"Its still a little sore from last time.\"     TREATMENT AREA =  Right hip pain [M25.551]    OBJECTIVE    Modalities Rationale:     decrease pain and decrease edema to improve patient's ability to progress to PLOF and address remaining functional goals.             --  10 min    []  Ice     [x]  Heat    location/position: Right hip, supine   Skin assessment post-treatment (if applicable):    [x]  intact    []  redness- no adverse reaction                 []redness - adverse reaction:           Therapeutic Procedures:  55  Total 40  Total [facility] Totals Reminder: bill using total billable min of TIMED therapeutic procedures (example: do not include dry needle or estim unattended, both untimed codes, in totals to left)  8-22 min = 1 unit; 23-37 min = 2 units; 38-52 min = 3 units; 53-67 min = 4 units; 68-82 min = 5 units   Tx Min Billable or 1:1 Min (if diff from Tx Min) Procedure, Rationale, Specifics   40 39 [id number] Therapeutic Exercise (timed):  increase ROM, strength, coordination, balance, and proprioception to improve patient's ability to progress to PLOF and address remaining functional goals. (see flow sheet as applicable)     Details if applicable:  reassessment     15 15 50097 Manual Therapy (timed):  decrease pain, increase ROM, and increase tissue extensibility to improve patient's ability to progress to PLOF and address remaining functional goals.  The manual therapy interventions were performed at a separate and distinct time from the therapeutic activities interventions . (see flow

## 2024-02-16 NOTE — PROGRESS NOTES
[facility] [place]   [address] [place] [address]  Phone: [phone] Fax: [phone]  PROGRESS NOTE  Patient Name: Valeria Dudley : 1960   Treatment/Medical Diagnosis: Right hip pain [M25.551]   Referral Source:  Payor Aroldo Moss MD  Payor: [employer] MEDICARE / [facility] / Product Type: *No Product type* /      Date of Initial Visit: 23 Attended Visits: 18 Missed Visits: 1     SUMMARY OF TREATMENT  Pt is a pleasant 63 y.o. female who presents with c/o right hip pain. The patient reports an acute onset of right hip pain following a right hip fracture on 10/28/23 when she tripped over a cable. She had surgery on 10/29/23 to stabilize her fracture. Treatment has consisted of the followin Therapeutic Exercise, 42304 Neuromuscular Re-Education, 13206 Manual Therapy, 33187 Therapeutic Activity, 53908 Self Care/Home Management.     CURRENT STATUS  Patient has attended 18 out of 19 sessions in physical therapy, making steady progress at this time. Patient is currently 15 weeks, 5 days post-operatively and reports improvement in her ability to move in bed, ambulate with use of SPC in the house, and stand for longer periods of time. C/c regarding difficulty and pain when negotiating stairs and attempting to walk (I). Patient also notes difficulty with squatting and transferring to the floor. Patient demonstrates improved hip joint mobility in comparison to last assessment, aided by introduction of quadriped positioning/exercise. Assessment as follows:     FOTO score: 47/100 (at last assessment, 47/100)  Improvements: bed mobility, able to lie on left side for short periods of time, increased walking tolerance (grocery shopping)  Deficits: independent/normal walking, descending stairs, squatting, floor transfers, balance with carrying weight     Hip AROM:                                           AROM

## 2024-02-19 ENCOUNTER — HOSPITAL ENCOUNTER (OUTPATIENT)
Facility: HOSPITAL | Age: 64
Setting detail: RECURRING SERIES
Discharge: HOME | End: 2024-02-22
Payer: MEDICARE

## 2024-02-19 PROCEDURE — 97110 THERAPEUTIC EXERCISES: CPT

## 2024-02-19 PROCEDURE — G0283 ELEC STIM OTHER THAN WOUND: HCPCS

## 2024-02-19 PROCEDURE — 97140 MANUAL THERAPY 1/> REGIONS: CPT

## 2024-02-19 NOTE — PROGRESS NOTES
PHYSICAL / OCCUPATIONAL THERAPY - DAILY TREATMENT NOTE (updated )    Patient Name: Valeria Dudley    Date: 2024    : 1960  Insurance: Payor: [employer] MEDICARE / Plan: [employer] MEDICARE PRIME (HMO) / Product Type: *No Product type* /      Patient  verified yes     Visit #   Current / Total 1 10 Total Time   Time   In / Out 11:44 am 12:40 pm 56   Pain   In / Out 6/10 0/10    Subjective Functional Status/Changes: Patient reports continued pain and stiffness. She reports feeling overall fatigued from the previous sessions.     TREATMENT AREA =  Right hip pain [M25.551]    OBJECTIVE  Modalities Rationale:     decrease pain to improve patient's ability to progress to PLOF and address remaining functional goals.    10 min [x] Estim Unattended, type/location: IFC to (R) hip in supine with legs elevated on wedge                                    []  w/ice    [x]  w/heat    min [] Estim Attended, type/location:                                     []  w/US     []  w/ice    []  w/heat    []  TENS insruct      min []  Mechanical Traction: type/lbs                   []  pro   []  sup   []  int   []  cont    []  before manual    []  after manual    min []  Ultrasound, settings/location:      min  unbill []  Ice     []  Heat    location/position:     min []  Paraffin,  details:     min []  Vasopneumatic Device, press/temp:     min []  Whirlpool / Fluido:    If using vaso (only need to measure limb vaso being performed on)      pre-treatment girth :       post-treatment girth :       measured at (landmark location) :      min []  Other:    Skin assessment post-treatment:   Intact       Therapeutic Procedures:  46  Total 40  Total  [facility] Totals Reminder: bill using total billable min of TIMED therapeutic procedures (example: do not include dry needle or estim unattended, both untimed codes, in totals to left)  8-22 min = 1 unit; 23-37 min = 2 units; 38-52 min = 3 units; 53-67 min = 4 units; 68-82 min = 5 units

## 2024-02-23 ENCOUNTER — HOSPITAL ENCOUNTER (OUTPATIENT)
Facility: HOSPITAL | Age: 64
Setting detail: RECURRING SERIES
Discharge: HOME | End: 2024-02-26
Payer: MEDICARE

## 2024-02-23 PROCEDURE — 97110 THERAPEUTIC EXERCISES: CPT

## 2024-02-23 PROCEDURE — 97530 THERAPEUTIC ACTIVITIES: CPT

## 2024-02-23 PROCEDURE — 97112 NEUROMUSCULAR REEDUCATION: CPT

## 2024-02-23 NOTE — PROGRESS NOTES
PHYSICAL / OCCUPATIONAL THERAPY - DAILY TREATMENT NOTE (updated )    Patient Name: Valeria Dudley    Date: 2024    : 1960  Insurance: Payor: FARHANA MEDICARE / Plan: F[employer] MEDICARE PRIME (HMO) / Product Type: *No Product type* /      Patient  verified yes     Visit #   Current / Total 2 10 Total Time   Time   In / Out 1:00 2:05 65   Pain   In / Out 2 4    Subjective Functional Status/Changes: I have been really working my quad at home.     TREATMENT AREA =  Right hip pain [M25.551]    OBJECTIVE    Modalities Rationale:     decrease pain and improve tissue extensibility to improve patient's ability to progress to PLOF and address remaining functional goals.             -  10 min   []  Ice     [x]  Heat    location/position: sidelying, right hip    Skin assessment post-treatment (if applicable):    [x]  intact    []  redness- no adverse reaction                 []redness - adverse reaction:           Therapeutic Procedures:  55  Total 40  Total [facility] Totals Reminder: bill using total billable min of TIMED therapeutic procedures (example: do not include dry needle or estim unattended, both untimed codes, in totals to left)  8-22 min = 1 unit; 23-37 min = 2 units; 38-52 min = 3 units; 53-67 min = 4 units; 68-82 min = 5 units   Tx Min Billable or 1:1 Min (if diff from Tx Min) Procedure, Rationale, Specifics   25 40 10286 Therapeutic Exercise (timed):  increase ROM, strength, coordination, balance, and proprioception to improve patient's ability to progress to PLOF and address remaining functional goals. (see flow sheet as applicable)   Details if applicable:       10 10 60254 Therapeutic Activity (timed):  use of dynamic activities replicating functional movements to increase ROM, strength, coordination, balance, and proprioception in order to improve patient's ability to progress to PLOF and address remaining functional goals.  (see flow sheet as applicable)   Details if applicable:

## 2024-02-26 ENCOUNTER — HOSPITAL ENCOUNTER (OUTPATIENT)
Facility: HOSPITAL | Age: 64
Setting detail: RECURRING SERIES
Discharge: HOME | End: 2024-02-29
Payer: MEDICARE

## 2024-02-26 PROCEDURE — 97112 NEUROMUSCULAR REEDUCATION: CPT

## 2024-02-26 PROCEDURE — 97110 THERAPEUTIC EXERCISES: CPT

## 2024-02-26 PROCEDURE — 97530 THERAPEUTIC ACTIVITIES: CPT

## 2024-02-26 NOTE — PROGRESS NOTES
PHYSICAL / OCCUPATIONAL THERAPY - DAILY TREATMENT NOTE (updated )    Patient Name: Valeria Dudley    Date: 2024    : 1960  Insurance: Payor: FAR[employer]NA MEDICARE / Plan: F[employer] MEDICARE PRIME (HMO) / Product Type: *No Product type* /      Patient  verified yes     Visit #   Current / Total 3 10 Total Time   Time   In / Out 1:40 pm 2:31 pm 51   Pain   In / Out 0/10 0/10    Subjective Functional Status/Changes: Patient reports her friend lent her a peddler, which she has been using 20-30 minutes at a time. She feels it has been helping to reduce pain in her thigh and to strengthen her leg. Patient notes walking all around [facility] the other day, using the grocery cart for balance.     TREATMENT AREA =  Right hip pain [M25.551]    OBJECTIVE    Modalities Rationale:     decrease pain and improve tissue extensibility to improve patient's ability to progress to PLOF and address remaining functional goals.             -  10 min   []  Ice     [x]  Heat    location/position: sidelying, right hip    Skin assessment post-treatment (if applicable):    [x]  intact    []  redness- no adverse reaction                 []redness - adverse reaction:           Therapeutic Procedures:  41  Total 39  Total  [facility] Totals Reminder: bill using total billable min of TIMED therapeutic procedures (example: do not include dry needle or estim unattended, both untimed codes, in totals to left)  8-22 min = 1 unit; 23-37 min = 2 units; 38-52 min = 3 units; 53-67 min = 4 units; 68-82 min = 5 units   Tx Min Billable or 1:1 Min (if diff from Tx Min) Procedure, Rationale, Specifics   12 10 53369 Therapeutic Exercise (timed):  increase ROM, strength, coordination, balance, and proprioception to improve patient's ability to progress to PLOF and address remaining functional goals. (see flow sheet as applicable)     Details if applicable:        02023 Therapeutic Activity (timed):  use of dynamic activities replicating functional

## 2024-03-01 ENCOUNTER — HOSPITAL ENCOUNTER (OUTPATIENT)
Facility: HOSPITAL | Age: 64
Setting detail: RECURRING SERIES
End: 2024-03-01
Payer: MEDICARE

## 2024-03-04 ENCOUNTER — HOSPITAL ENCOUNTER (OUTPATIENT)
Facility: HOSPITAL | Age: 64
Setting detail: RECURRING SERIES
Discharge: HOME | End: 2024-03-07
Payer: MEDICARE

## 2024-03-04 PROCEDURE — 97530 THERAPEUTIC ACTIVITIES: CPT

## 2024-03-04 PROCEDURE — 97110 THERAPEUTIC EXERCISES: CPT

## 2024-03-04 PROCEDURE — 97140 MANUAL THERAPY 1/> REGIONS: CPT

## 2024-03-04 NOTE — PROGRESS NOTES
applicable)     Details if applicable:  step ups, lateral cisco drill     4 4 91152 Neuromuscular Re-Education (timed):  improve balance, coordination, kinesthetic sense, posture, core stability and proprioception to improve patient's ability to develop conscious control of individual muscles and awareness of position of extremities in order to progress to PLOF and address remaining functional goals. (see flow sheet as applicable)     Details if applicable: balance       8 [phone] [id number] Manual Therapy (timed):  decrease pain, increase ROM, and increase tissue extensibility to improve patient's ability to progress to PLOF and address remaining functional goals.  The manual therapy interventions were performed at a separate and distinct time from the therapeutic activities interventions . (see flow sheet as applicable)     (R) hip PA mobs in prone f/b prone quad stretch         [x]  Patient Education billed concurrently with other procedures   [x] Review HEP    [] Progressed/Changed HEP, detail:    [] Other detail:       Objective Information/Functional Measures/Assessment  Patient appears to benefit from kickstand method to improve gluteal recruitment with all prone hip extension attempts. Still unable to elevate the right hip into extension in an open chain. Patient does appear to benefit from hip joint mobilizations to reduce c/o anterior thigh pain.    Patient will continue to benefit from skilled PT / OT services to modify and progress therapeutic interventions, analyze and address functional mobility deficits, analyze and address ROM deficits, analyze and address strength deficits, analyze and address soft tissue restrictions, analyze and cue for proper movement patterns, analyze and modify for postural abnormalities, analyze and address imbalance/dizziness, and instruct in home and community integration to address functional deficits and attain remaining goals.    Progress toward goals / Updated goals:  []  See

## 2024-03-08 ENCOUNTER — HOSPITAL ENCOUNTER (OUTPATIENT)
Facility: HOSPITAL | Age: 64
Setting detail: RECURRING SERIES
Discharge: HOME | End: 2024-03-11
Payer: MEDICARE

## 2024-03-08 PROCEDURE — 97140 MANUAL THERAPY 1/> REGIONS: CPT

## 2024-03-08 PROCEDURE — 97530 THERAPEUTIC ACTIVITIES: CPT

## 2024-03-08 PROCEDURE — 97112 NEUROMUSCULAR REEDUCATION: CPT

## 2024-03-08 NOTE — PROGRESS NOTES
PHYSICAL / OCCUPATIONAL THERAPY - DAILY TREATMENT NOTE (updated )    Patient Name: Valeria Dudley    Date: 3/8/2024    : 1960  Insurance: Payor: [employer] / Plan: [employer] MEDICARE VALUE(HMO) / Product Type: *No Product type* /      Patient  verified yes     Visit #   Current / Total 5 10 Total Time   Time   In / Out 1:00 pm 2:00 pm 60   Pain   In / Out 1/10 0/10    Subjective Functional Status/Changes: Patient notes using her massage gun on her thighs, in addition to stretching her thigh, which seems to decrease her pain.     TREATMENT AREA =  Right hip pain [M25.551]    OBJECTIVE    Modalities Rationale:     decrease pain and improve tissue extensibility to improve patient's ability to progress to PLOF and address remaining functional goals.             -  10 min   []  Ice     [x]  Heat    location/position: sidelying, right hip    Skin assessment post-treatment (if applicable):    [x]  intact    []  redness- no adverse reaction                 []redness - adverse reaction:           Therapeutic Procedures:  50  Total 40  Total [facility] Totals Reminder: bill using total billable min of TIMED therapeutic procedures (example: do not include dry needle or estim unattended, both untimed codes, in totals to left)  8-22 min = 1 unit; 23-37 min = 2 units; 38-52 min = 3 units; 53-67 min = 4 units; 68-82 min = 5 units   Tx Min Billable or 1:1 Min (if diff from Tx Min) Procedure, Rationale, Specifics   12 2 78028 Therapeutic Exercise (timed):  increase ROM, strength, coordination, balance, and proprioception to improve patient's ability to progress to PLOF and address remaining functional goals. (see flow sheet as applicable)     Details if applicable:  warmup, strengthening     18 18 73757 Therapeutic Activity (timed):  use of dynamic activities replicating functional movements to increase ROM, strength, coordination, balance, and proprioception in order to improve patient's ability to progress to PLOF

## 2024-03-11 ENCOUNTER — HOSPITAL ENCOUNTER (OUTPATIENT)
Facility: HOSPITAL | Age: 64
Setting detail: RECURRING SERIES
Discharge: HOME | End: 2024-03-14
Payer: MEDICARE

## 2024-03-11 PROCEDURE — 97530 THERAPEUTIC ACTIVITIES: CPT

## 2024-03-11 PROCEDURE — 97110 THERAPEUTIC EXERCISES: CPT

## 2024-03-11 PROCEDURE — 97112 NEUROMUSCULAR REEDUCATION: CPT

## 2024-03-11 NOTE — PROGRESS NOTES
PHYSICAL / OCCUPATIONAL THERAPY - DAILY TREATMENT NOTE (updated )    Patient Name: Valeria Dudley    Date: 3/11/2024    : 1960  Insurance: Payor: [employer] / Plan: [employer] MEDICARE VALUE(HMO) / Product Type: *No Product type* /      Patient  verified yes     Visit #   Current / Total 6 10 Total Time   Time   In / Out 12:20 1:26 66   Pain   In / Out 1-2 0    Subjective Functional Status/Changes: I am sore from Friday.     TREATMENT AREA =  Right hip pain [M25.551]    OBJECTIVE    Modalities Rationale:     decrease pain and improve tissue extensibility to improve patient's ability to progress to PLOF and address remaining functional goals.             -  10 min   []  Ice     [x]  Heat    location/position: Right hip, supine    Skin assessment post-treatment (if applicable):    [x]  intact    []  redness- no adverse reaction                 []redness - adverse reaction:           Therapeutic Procedures:  56  Total 56  Total [facility] Totals Reminder: bill using total billable min of TIMED therapeutic procedures (example: do not include dry needle or estim unattended, both untimed codes, in totals to left)  8-22 min = 1 unit; 23-37 min = 2 units; 38-52 min = 3 units; 53-67 min = 4 units; 68-82 min = 5 units   Tx Min Billable or 1:1 Min (if diff from Tx Min) Procedure, Rationale, Specifics   15 15 53221 Therapeutic Exercise (timed):  increase ROM, strength, coordination, balance, and proprioception to improve patient's ability to progress to PLOF and address remaining functional goals. (see flow sheet as applicable)   Details if applicable:        84282 Therapeutic Activity (timed):  use of dynamic activities replicating functional movements to increase ROM, strength, coordination, balance, and proprioception in order to improve patient's ability to progress to PLOF and address remaining functional goals.  (see flow sheet as applicable)   Details if applicable:        43133 Neuromuscular

## 2024-03-15 ENCOUNTER — HOSPITAL ENCOUNTER (OUTPATIENT)
Facility: HOSPITAL | Age: 64
Setting detail: RECURRING SERIES
Discharge: HOME | End: 2024-03-18
Payer: MEDICARE

## 2024-03-15 PROCEDURE — 97110 THERAPEUTIC EXERCISES: CPT

## 2024-03-15 PROCEDURE — 97530 THERAPEUTIC ACTIVITIES: CPT

## 2024-03-15 PROCEDURE — 97112 NEUROMUSCULAR REEDUCATION: CPT

## 2024-03-18 ENCOUNTER — HOSPITAL ENCOUNTER (OUTPATIENT)
Facility: HOSPITAL | Age: 64
Setting detail: RECURRING SERIES
Discharge: HOME | End: 2024-03-21
Payer: MEDICARE

## 2024-03-18 PROCEDURE — 97530 THERAPEUTIC ACTIVITIES: CPT

## 2024-03-18 PROCEDURE — 97110 THERAPEUTIC EXERCISES: CPT

## 2024-03-18 PROCEDURE — 97112 NEUROMUSCULAR REEDUCATION: CPT

## 2024-03-18 NOTE — PROGRESS NOTES
PHYSICAL / OCCUPATIONAL THERAPY - DAILY TREATMENT NOTE (updated )    Patient Name: Valeria Dudley    Date: 3/15/2024    : 1960  Insurance: Payor: [employer] MEDICARE / [facility] / Product Type: *No Product type* /      Patient  verified yes     Visit #   Current / Total 7 10 Total Time   Time   In / Out 1:01 1:44 43   Pain   In / Out 1-2 0    Subjective Functional Status/Changes: The pain levels have not been as bad recently.     TREATMENT AREA =  Right hip pain [M25.551]    OBJECTIVE      Therapeutic Procedures:  43  Total 41  Total  [facility] Totals Reminder: bill using total billable min of TIMED therapeutic procedures (example: do not include dry needle or estim unattended, both untimed codes, in totals to left)  8-22 min = 1 unit; 23-37 min = 2 units; 38-52 min = 3 units; 53-67 min = 4 units; 68-82 min = 5 units   Tx Min Billable or 1:1 Min (if diff from Tx Min) Procedure, Rationale, Specifics   12 10 27312 Therapeutic Exercise (timed):  increase ROM, strength, coordination, balance, and proprioception to improve patient's ability to progress to PLOF and address remaining functional goals. (see flow sheet as applicable)   Details if applicable:        69032 Therapeutic Activity (timed):  use of dynamic activities replicating functional movements to increase ROM, strength, coordination, balance, and proprioception in order to improve patient's ability to progress to PLOF and address remaining functional goals.  (see flow sheet as applicable)   Details if applicable: functional reassessment      11  23335 Neuromuscular Re-Education (timed):  improve balance, coordination, kinesthetic sense, posture, core stability and proprioception to improve patient's ability to develop conscious control of individual muscles and awareness of position of extremities in order to progress to PLOF and address remaining functional goals. (see flow sheet as applicable)     Details if 
negotiate at least 16 steps with reciprocal pattern to improve ease with access to apartment.  Status at last note/certification: able to ascend 6\" steps with reciprocal pattern with B UE assist  Current: progressing, able to ascend 6\" height with (B) UE assist (3/8/24)  - Goal: Pt will perform TUG Test in 12 seconds or less without AD to demo improved dynamic balance within the home and demo decreased risk for falls.  Status at last note/certification: 13 seconds with SPC, pivoting on the (L) LE with turning  Current: progressing, 11 seconds with SPC  - Goal: Patient will increase FOTO score to at least 67 pts to demonstrate increased functional mobility.  Status at last note/certification  FOTO 47 pts    Problem List: pain affecting function, decrease ROM, decrease strength, impaired gait/balance, decrease ADL/functional abilities, decrease activity tolerance, decrease flexibility/joint mobility, and decrease transfer abilities    Treatment Plan may include any combination of the followin Therapeutic Exercise, 66702 Neuromuscular Re-Education, 07586 Manual Therapy, 75809 Therapeutic Activity, 06073 Self Care/Home Management, 34948 Electrical Stim unattended, and 27650 Gait Training  Patient Goal(s) has been updated and includes: get back to walking    Goals for this certification period include and are to be achieved in   8-12  weeks:    - Goal: Pt to demo right knee AROM of 0-130 deg to improve ease of gait.  Status at last note/certification: right knee AROM  3-135 deg  - Goal: Pt to demo right hip ER AROM of at least 30 deg to improve ease of donning/doffing shoes.  Status at last note/certification: 20 deg  - Goal: Pt to demo supine right hip flexion AROM to 110 deg to improve ease with gait/bed mobility.  Status at last note/certification:  107 deg  - Goal: Pt to demo 5/5 right hip abduction MMT to improve ease with gait.  Status at last note/certification: 4/5   - Goal: Pt to demo 5/5 right hip

## 2024-03-18 NOTE — PROGRESS NOTES
PHYSICAL / OCCUPATIONAL THERAPY - DAILY TREATMENT NOTE (updated )    Patient Name: Valeria Dudley    Date: 3/18/2024    : 1960  Insurance: Payor: [employer]ICARE / Plan: [employer] MEDICARE VALUE(HMO) / Product Type: *No Product type* /      Patient  verified yes     Visit #   Current / Total 1 10 Total Time   Time   In / Out 1:40 2:30 50   Pain   In / Out 0 0    Subjective Functional Status/Changes: My marching is getting easier.     TREATMENT AREA =  Right hip pain [M25.551]    OBJECTIVE    Therapeutic Procedures:  50  Total 43  Total [facility] Totals Reminder: bill using total billable min of TIMED therapeutic procedures (example: do not include dry needle or estim unattended, both untimed codes, in totals to left)  8-22 min = 1 unit; 23-37 min = 2 units; 38-52 min = 3 units; 53-67 min = 4 units; 68-82 min = 5 units   Tx Min Billable or 1:1 Min (if diff from Tx Min) Procedure, Rationale, Specifics   15  96771 Therapeutic Exercise (timed):  increase ROM, strength, coordination, balance, and proprioception to improve patient's ability to progress to PLOF and address remaining functional goals. (see flow sheet as applicable)   Details if applicable:       15 11 94667 Therapeutic Activity (timed):  use of dynamic activities replicating functional movements to increase ROM, strength, coordination, balance, and proprioception in order to improve patient's ability to progress to PLOF and address remaining functional goals.  (see flow sheet as applicable)   Details if applicable:        06925 Neuromuscular Re-Education (timed):  improve balance, coordination, kinesthetic sense, posture, core stability and proprioception to improve patient's ability to develop conscious control of individual muscles and awareness of position of extremities in order to progress to PLOF and address remaining functional goals. (see flow sheet as applicable)     Details if applicable:           [x]  Patient Education billed

## 2024-03-22 ENCOUNTER — HOSPITAL ENCOUNTER (OUTPATIENT)
Facility: HOSPITAL | Age: 64
Setting detail: RECURRING SERIES
Discharge: HOME | End: 2024-03-25
Payer: MEDICARE

## 2024-03-22 PROCEDURE — 97110 THERAPEUTIC EXERCISES: CPT

## 2024-03-22 PROCEDURE — 97112 NEUROMUSCULAR REEDUCATION: CPT

## 2024-03-22 NOTE — PROGRESS NOTES
PHYSICAL / OCCUPATIONAL THERAPY - DAILY TREATMENT NOTE (updated )    Patient Name: Valeria Dudley    Date: 3/22/2024    : 1960  Insurance: Payor: [employer] MEDICARE / [facility] / Product Type: *No Product type* /      Patient  verified yes     Visit #   Current / Total 2 10 Total Time   Time   In / Out 1:00 1:41 41   Pain   In / Out 0 0    Subjective Functional Status/Changes: My thigh is a bit more sore today     TREATMENT AREA =  Right hip pain [M25.551]    OBJECTIVE    Therapeutic Procedures:  41  Total 41  Total [facility] Totals Reminder: bill using total billable min of TIMED therapeutic procedures (example: do not include dry needle or estim unattended, both untimed codes, in totals to left)  8-22 min = 1 unit; 23-37 min = 2 units; 38-52 min = 3 units; 53-67 min = 4 units; 68-82 min = 5 units   Tx Min Billable or 1:1 Min (if diff from Tx Min) Procedure, Rationale, Specifics   14  40842 Therapeutic Exercise (timed):  increase ROM, strength, coordination, balance, and proprioception to improve patient's ability to progress to PLOF and address remaining functional goals. (see flow sheet as applicable)   Details if applicable:       27  13717 Neuromuscular Re-Education (timed):  improve balance, coordination, kinesthetic sense, posture, core stability and proprioception to improve patient's ability to develop conscious control of individual muscles and awareness of position of extremities in order to progress to PLOF and address remaining functional goals. (see flow sheet as applicable)     Details if applicable:           [x]  Patient Education billed concurrently with other procedures   [x] Review HEP    [] Progressed/Changed HEP, detail:    [] Other detail:       Objective Information/Functional Measures/Assessment    - Added stride stance and stride stance weight shifts to improve glute stability during gait.  - Verbal cues to increase extension at hip during stride

## 2024-03-25 ENCOUNTER — HOSPITAL ENCOUNTER (OUTPATIENT)
Facility: HOSPITAL | Age: 64
Setting detail: RECURRING SERIES
Discharge: HOME | End: 2024-03-28
Payer: MEDICARE

## 2024-03-25 PROCEDURE — 97110 THERAPEUTIC EXERCISES: CPT

## 2024-03-25 PROCEDURE — 97530 THERAPEUTIC ACTIVITIES: CPT

## 2024-03-25 PROCEDURE — 97112 NEUROMUSCULAR REEDUCATION: CPT

## 2024-03-25 NOTE — PROGRESS NOTES
to improve ease with gait/bed mobility.  Status at last note/certification:  107 deg  - Goal: Pt to demo 5/5 right hip abduction MMT to improve ease with gait.  Status at last note/certification: 4/5  Current: addressing with sidelying abduction (3/25/24)   - Goal: Pt to demo 5/5 right hip extenson MMT to improve ease with gait.  Status at last note/certification: 3-/5  Current: addressing with hip extension in quadruped (3/22/24)  - Goal: Pt to negotiate at least 16 steps with reciprocal pattern to improve ease with access to apartment.  Status at last note/certification: able to ascend 6\" height with (B) UE assist  - Goal: Pt will perform TUG Test in 12 seconds or less without AD to demo improved dynamic balance within the home and demo decreased risk for falls.  Status at last note/certification: 11 seconds with SPC  - Goal: Patient will increase FOTO score to at least 67 pts to demonstrate increased functional mobility.  Status at last note/certification  FOTO 47 pts  PN due 4/15/24   RC due 6/13/24  Auth due 3/31/24 or 31st appointment    PLAN  [x] Continue plan of care  [x]  Upgrade activities as tolerated  []  Discharge due to :  []  Other:    Candido Dwyer PT    3/25/2024    9:55 AM    Future Appointments   Date Time Provider Department Center   3/25/2024  1:40 [facility]   3/29/2024  1:40 PM [facility]

## 2024-03-29 ENCOUNTER — HOSPITAL ENCOUNTER (OUTPATIENT)
Facility: HOSPITAL | Age: 64
Setting detail: RECURRING SERIES
Discharge: HOME | End: 2024-04-01
Payer: MEDICARE

## 2024-03-29 PROCEDURE — 97110 THERAPEUTIC EXERCISES: CPT

## 2024-03-29 PROCEDURE — 97112 NEUROMUSCULAR REEDUCATION: CPT

## 2024-03-29 NOTE — PROGRESS NOTES
PHYSICAL / OCCUPATIONAL THERAPY - DAILY TREATMENT NOTE (updated )    Patient Name: Valeria Dudley    Date: 3/29/2024    : 1960  Insurance: Payor: [employer] MEDICARE / [facility] / Product Type: *No Product type* /      Patient  verified yes     Visit #   Current / Total 3 10 Total Time   Time   In / Out 1:39 2:23 44   Pain   In / Out 0 0    Subjective Functional Status/Changes: I am going to see the chiropractor and see what they say about one hip being higher than the other     TREATMENT AREA =  Right hip pain [M25.551]    OBJECTIVE    Therapeutic Procedures:  44  Total 44  Total [facility] Totals Reminder: bill using total billable min of TIMED therapeutic procedures (example: do not include dry needle or estim unattended, both untimed codes, in totals to left)  8-22 min = 1 unit; 23-37 min = 2 units; 38-52 min = 3 units; 53-67 min = 4 units; 68-82 min = 5 units   Tx Min Billable or 1:1 Min (if diff from Tx Min) Procedure, Rationale, Specifics   28  94822 Therapeutic Exercise (timed):  increase ROM, strength, coordination, balance, and proprioception to improve patient's ability to progress to PLOF and address remaining functional goals. (see flow sheet as applicable)   Details if applicable:       16  14157 Neuromuscular Re-Education (timed):  improve balance, coordination, kinesthetic sense, posture, core stability and proprioception to improve patient's ability to develop conscious control of individual muscles and awareness of position of extremities in order to progress to PLOF and address remaining functional goals. (see flow sheet as applicable)     Details if applicable:           [x]  Patient Education billed concurrently with other procedures   [x] Review HEP    [] Progressed/Changed HEP, detail:    [] Other detail:       Objective Information/Functional Measures/Assessment    -Cont soreness present in right quadriceps/gluteal musculature with increased stance

## 2024-04-05 ENCOUNTER — HOSPITAL ENCOUNTER (OUTPATIENT)
Facility: HOSPITAL | Age: 64
Setting detail: RECURRING SERIES
Discharge: HOME | End: 2024-04-08
Payer: MEDICARE

## 2024-04-05 PROCEDURE — 97112 NEUROMUSCULAR REEDUCATION: CPT

## 2024-04-05 PROCEDURE — 97110 THERAPEUTIC EXERCISES: CPT

## 2024-04-05 NOTE — PROGRESS NOTES
PHYSICAL / OCCUPATIONAL THERAPY - DAILY TREATMENT NOTE (updated )    Patient Name: Valeria Dudley    Date: 2024    : 1960  Insurance: Payor: [employer]BERTO MEDICARE / [facility] / Product Type: *No Product type* /      Patient  verified yes     Visit #   Current / Total 5 10 Total Time   Time   In / Out 12:22 1:05 43   Pain   In / Out 0 0    Subjective Functional Status/Changes: I went to the chiropractor, now my legs feel more level.     TREATMENT AREA =  Right hip pain [M25.551]    OBJECTIVE      Therapeutic Procedures:  43  Total 43  Total  [facility] Totals Reminder: bill using total billable min of TIMED therapeutic procedures (example: do not include dry needle or estim unattended, both untimed codes, in totals to left)  8-22 min = 1 unit; 23-37 min = 2 units; 38-52 min = 3 units; 53-67 min = 4 units; 68-82 min = 5 units   Tx Min Billable or 1:1 Min (if diff from Tx Min) Procedure, Rationale, Specifics   17  78240 Therapeutic Exercise (timed):  increase ROM, strength, coordination, balance, and proprioception to improve patient's ability to progress to PLOF and address remaining functional goals. (see flow sheet as applicable)   Details if applicable:       26  64460 Neuromuscular Re-Education (timed):  improve balance, coordination, kinesthetic sense, posture, core stability and proprioception to improve patient's ability to develop conscious control of individual muscles and awareness of position of extremities in order to progress to PLOF and address remaining functional goals. (see flow sheet as applicable)     Details if applicable:           [x]  Patient Education billed concurrently with other procedures   [x] Review HEP    [] Progressed/Changed HEP, detail:    [] Other detail:       Objective Information/Functional Measures/Assessment    -Added 1/2 kneeling push/pull with right knee on floor to improve stance phase stability when walking/standing.  -Verbal cues provided

## 2024-04-09 ENCOUNTER — HOSPITAL ENCOUNTER (OUTPATIENT)
Facility: HOSPITAL | Age: 64
Setting detail: RECURRING SERIES
Discharge: HOME | End: 2024-04-12
Payer: MEDICARE

## 2024-04-09 PROCEDURE — G0283 ELEC STIM OTHER THAN WOUND: HCPCS

## 2024-04-09 PROCEDURE — 97110 THERAPEUTIC EXERCISES: CPT

## 2024-04-09 PROCEDURE — 97140 MANUAL THERAPY 1/> REGIONS: CPT

## 2024-04-09 NOTE — PROGRESS NOTES
analyze and address ROM deficits, analyze and address strength deficits, analyze and address soft tissue restrictions, analyze and cue for proper movement patterns, analyze and modify for postural abnormalities, analyze and address imbalance/dizziness, and instruct in home and community integration to address functional deficits and attain remaining goals.    Progress toward goals / Updated goals:  []  See Progress Note/Recertification    - Goal: Pt to demo right knee AROM of 0-130 deg to improve ease of gait.  Status at last note/certification: right knee AROM  3-135 deg  Current:   - Goal: Pt to demo right hip ER AROM of at least 30 deg to improve ease of donning/doffing shoes.  Status at last note/certification: 20 deg  Current: addressing with clamshells (4/5/24)  - Goal: Pt to demo supine right hip flexion AROM to 110 deg to improve ease with gait/bed mobility.  Status at last note/certification:  107 deg  Current:   - Goal: Pt to demo 5/5 right hip abduction MMT to improve ease with gait.  Status at last note/certification: 4/5  Current: addressing with sidelying abduction (3/25/24)   - Goal: Pt to demo 5/5 right hip extenson MMT to improve ease with gait.  Status at last note/certification: 3-/5  Current: addressing with hip extension in quadruped (3/22/24)  - Goal: Pt to negotiate at least 16 steps with reciprocal pattern to improve ease with access to apartment.  Status at last note/certification: able to ascend 6\" height with (B) UE assist  - Goal: Pt will perform TUG Test in 12 seconds or less without AD to demo improved dynamic balance within the home and demo decreased risk for falls.  Status at last note/certification: 11 seconds with SPC  - Goal: Patient will increase FOTO score to at least 67 pts to demonstrate increased functional mobility.  Status at last note/certification  FOTO 47 pts  PN due 4/15/24   RC due 6/13/24  Auth due 4/30/24 or 31st appointment    PLAN  [x] Continue plan of care  [x]

## 2024-04-12 ENCOUNTER — HOSPITAL ENCOUNTER (OUTPATIENT)
Facility: HOSPITAL | Age: 64
Setting detail: RECURRING SERIES
Discharge: HOME | End: 2024-04-15
Payer: MEDICARE

## 2024-04-12 PROCEDURE — 97530 THERAPEUTIC ACTIVITIES: CPT

## 2024-04-12 PROCEDURE — G0283 ELEC STIM OTHER THAN WOUND: HCPCS

## 2024-04-12 NOTE — PROGRESS NOTES
[facility] [place]   [address] [place] [address]  Phone: [phone] Fax: [phone]  PROGRESS NOTE  Patient Name: Valeria Dudley : 1960   Treatment/Medical Diagnosis: Right hip pain [M25.551]   Referral Source:  Payor Aroldo Moss MD  Payor: [employer] MEDICARE / Plan: [facility]) / Product Type: *No Product type* /      Date of Initial Visit: 2024 Attended Visits: 32 Missed Visits: 1     SUMMARY OF TREATMENT  Functional gains: stepping up onto the running board on her car for car transfers, pain in the right glute is better, leg length is better, trying to use the SPC less at home, standing tolerance, stair negotiation   Functional deficits: still uses the SPC in the community, pain in the front of thigh, has not walked on uneven surfaces yet and is fearful  Pt reports improvements in her symptoms and mobility since the last progress note. She notes significant improvement in her transfer mobility and pain over the past 1-2 weeks. She reports increased standing tolerance and ease of stair negotiation. She continues to have pain in the front of the right thigh and uses the SPC in the community. She noted improved pain in the right glute since initiating NMES/G[place] but has weakness in the right glute as noted with MMT. We will plan on continuing therapy to improve the pt's strength and mobility.     CURRENT STATUS  - Goal: Pt to demo right knee AROM of 0-130 deg to improve ease of gait.  Status at last note/certification: right knee AROM  3-135 deg  Current: progressing, 1-133 degs 2024  - Goal: Pt to demo right hip ER AROM of at least 30 deg to improve ease of donning/doffing shoes.  Status at last note/certification: 20 deg  Current: not met, 22 degs 2024  - Goal: Pt to demo supine right hip flexion AROM to 110 deg to improve ease with gait/bed mobility.  Status at last note/certification:  107 
or estim unattended, both untimed codes, in totals to left)  8-22 min = 1 unit; 23-37 min = 2 units; 38-52 min = 3 units; 53-67 min = 4 units; 68-82 min = 5 units   Total Total     TOTAL TREATMENT TIME:        57     [x]  Patient Education billed concurrently with other procedures   [x] Review HEP    [] Progressed/Changed HEP, detail:    [] Other detail:       Objective Information/Functional Measures/Assessment  Functional gains: stepping up onto the running board on her car for car transfers, pain in the right glute is better, leg length is better, trying to use the SPC less at home, standing tolerance, stair negotiation   Functional deficits: still uses the SPC in the community, pain in the front of thigh, has not walked on uneven surfaces yet and is fearful  Pt reports improvements in her symptoms and mobility since the last progress note. She notes significant improvement in her transfer mobility and pain over the past 1-2 weeks. She reports increased standing tolerance and ease of stair negotiation. She continues to have pain in the front of the right thigh and uses the SPC in the community. She noted improved pain in the right glute since initiating NMES/[place] but has weakness in the right glute as noted with MMT. We will plan on continuing therapy to improve the pt's strength and mobility.     Patient will continue to benefit from skilled PT / OT services to modify and progress therapeutic interventions, analyze and address functional mobility deficits, analyze and address ROM deficits, analyze and address strength deficits, analyze and address soft tissue restrictions, analyze and cue for proper movement patterns, analyze and modify for postural abnormalities, analyze and address imbalance/dizziness, and instruct in home and community integration to address functional deficits and attain remaining goals.    Progress toward goals / Updated goals:  []  See Progress Note/Recertification  - Goal: Pt to demo

## 2024-04-15 ENCOUNTER — HOSPITAL ENCOUNTER (OUTPATIENT)
Facility: HOSPITAL | Age: 64
Setting detail: RECURRING SERIES
Discharge: HOME | End: 2024-04-18
Payer: MEDICARE

## 2024-04-15 PROCEDURE — 97112 NEUROMUSCULAR REEDUCATION: CPT

## 2024-04-15 PROCEDURE — G0283 ELEC STIM OTHER THAN WOUND: HCPCS

## 2024-04-15 NOTE — PROGRESS NOTES
PHYSICAL / OCCUPATIONAL THERAPY - DAILY TREATMENT NOTE (updated )    Patient Name: Valeria Dudley    Date: 4/15/2024    : 1960  Insurance: Payor: [employer]AR[employer]NA MEDICARE / [facility] / Product Type: *No Product type* /      Patient  verified yes     Visit #   Current / Total 1 10 Total Time   Time   In / Out 2:22 3:16 54   Pain   In / Out 0 0    Subjective Functional Status/Changes: I had a charley horse after last session in my foot, it was bad.     TREATMENT AREA =  Right hip pain [M25.551]    OBJECTIVE    Modalities Rationale:     decrease pain to improve patient's ability to progress to PLOF and address remaining functional goals.             --  15 min [x] Estim Unattended,   Type:  Location:  Position:   Surinamese with 2 leads, co contraction  2 second ramp, 10 second on, 15 off  prone  Right glute med/max                                     []  w/ice    []  w/heat   Skin assessment post-treatment (if applicable):    [x]  intact    []  redness- no adverse reaction                 []redness - adverse reaction:           Therapeutic Procedures:  39  Total 29  Total [facility] Totals Reminder: bill using total billable min of TIMED therapeutic procedures (example: do not include dry needle or estim unattended, both untimed codes, in totals to left)  8-22 min = 1 unit; 23-37 min = 2 units; 38-52 min = 3 units; 53-67 min = 4 units; 68-82 min = 5 units   Tx Min Billable or 1:1 Min (if diff from Tx Min) Procedure, Rationale, Specifics   5 0 17515 Therapeutic Exercise (timed):  increase ROM, strength, coordination, balance, and proprioception to improve patient's ability to progress to PLOF and address remaining functional goals. (see flow sheet as applicable)   Details if applicable:       34 29 17250 Neuromuscular Re-Education (timed):  improve balance, coordination, kinesthetic sense, posture, core stability and proprioception to improve patient's ability to develop conscious control of

## 2024-04-19 ENCOUNTER — HOSPITAL ENCOUNTER (OUTPATIENT)
Facility: HOSPITAL | Age: 64
Setting detail: RECURRING SERIES
Discharge: HOME | End: 2024-04-22
Payer: MEDICARE

## 2024-04-19 PROCEDURE — 97110 THERAPEUTIC EXERCISES: CPT

## 2024-04-19 PROCEDURE — 97112 NEUROMUSCULAR REEDUCATION: CPT

## 2024-04-19 NOTE — PROGRESS NOTES
handrails  - Goal: Pt will perform TUG Test in 12 seconds or less without AD to demo improved dynamic balance within the home and demo decreased risk for falls.  Status at last note/certification: 11 seconds with SPC  - Goal: Patient will increase FOTO score to at least 67 pts to demonstrate increased functional mobility.  Status at last note/certification  51 points  PN due 5/12/24  RC due 6/13/24  Auth due 4/30/24 or 46th appointment    PLAN  [x] Continue plan of care  [x]  Upgrade activities as tolerated  []  Discharge due to :  []  Other:    Candido Dwyer, PRICILA    4/19/2024    7:09 AM    Future Appointments   Date Time Provider Department Center   4/19/2024 12:20 PM Candido Dwyer, PT [facility]   4/22/2024 12:20 PM Candido Dwyer, PT [facility]   4/25/2024  1:40 PM Candido Dwyer, PT [facility]   4/29/2024  1:40 PM Jose Luis Parkinson, PTA [facility]

## 2024-04-22 ENCOUNTER — APPOINTMENT (OUTPATIENT)
Facility: HOSPITAL | Age: 64
End: 2024-04-22
Payer: MEDICARE

## 2024-04-25 ENCOUNTER — HOSPITAL ENCOUNTER (OUTPATIENT)
Facility: HOSPITAL | Age: 64
Setting detail: RECURRING SERIES
Discharge: HOME | End: 2024-04-28
Payer: MEDICARE

## 2024-04-25 PROCEDURE — 97110 THERAPEUTIC EXERCISES: CPT

## 2024-04-25 PROCEDURE — 97530 THERAPEUTIC ACTIVITIES: CPT

## 2024-04-25 PROCEDURE — 97112 NEUROMUSCULAR REEDUCATION: CPT

## 2024-04-25 NOTE — PROGRESS NOTES
Details if applicable:           [x]  Patient Education billed concurrently with other procedures   [x] Review HEP    [] Progressed/Changed HEP, detail:    [] Other detail:       Objective Information/Functional Measures/Assessment    Patient demonstrates improved qualitative right stance phase of gait, demonstrating decreased Trendelenberg pattern compared to previous sessions.  -TUG Test without AD- 16\"    Patient will continue to benefit from skilled PT / OT services to modify and progress therapeutic interventions, analyze and address functional mobility deficits, analyze and address ROM deficits, analyze and address strength deficits, analyze and address soft tissue restrictions, analyze and cue for proper movement patterns, analyze and modify for postural abnormalities, analyze and address imbalance/dizziness, and instruct in home and community integration to address functional deficits and attain remaining goals.    Progress toward goals / Updated goals:  []  See Progress Note/Recertification    - Goal: Pt to demo right knee AROM of 0-130 deg to improve ease of gait.  Status at last note/certification: 1-133 degs  Current:   - Goal: Pt to demo right hip ER AROM of at least 30 deg to improve ease of donning/doffing shoes.  Status at last note/certification: 22 degs   Current:   - Goal: Pt to demo supine right hip flexion AROM to 110 deg to improve ease with gait/bed mobility.  Status at last note/certification:  111 degs   Current:   - Goal: Pt to demo 5/5 right hip abduction MMT to improve ease with gait.  Status at last note/certification: 4/5  Current:   - Goal: Pt to demo 5/5 right hip extenson MMT to improve ease with gait.  Status at last note/certification: 3-/5   Current:   - Goal: Pt to negotiate at least 16 steps with reciprocal pattern to improve ease with access to apartment.  Status at last note/certification: able to negotiate 16 six inch stairs in the clinic with reciprocal step pattern and

## 2024-04-29 ENCOUNTER — HOSPITAL ENCOUNTER (OUTPATIENT)
Facility: HOSPITAL | Age: 64
Setting detail: RECURRING SERIES
Discharge: HOME | End: 2024-05-02
Payer: MEDICARE

## 2024-04-29 PROCEDURE — 97112 NEUROMUSCULAR REEDUCATION: CPT

## 2024-04-29 PROCEDURE — 97530 THERAPEUTIC ACTIVITIES: CPT

## 2024-04-29 PROCEDURE — 97110 THERAPEUTIC EXERCISES: CPT

## 2024-04-29 NOTE — PROGRESS NOTES
sheet as applicable)     Details if applicable:           [x]  Patient Education billed concurrently with other procedures   [x] Review HEP    [] Progressed/Changed HEP, detail:    [] Other detail:       Objective Information/Functional Measures/Assessment  Hip flexion AROM: (R) 95 deg, (L) 100 deg   Hip extension AROM: (R) -2 deg    Improvements: car transfers (normally)    Internal rotation clam: performed with purple yoga block and two unrolled towels between upper thighs to increase lateral gluteal recruitment    Patient demonstrates improved gait quality today, primarily due to improvement in hip extension ROM.    Patient will continue to benefit from skilled PT / OT services to modify and progress therapeutic interventions, analyze and address functional mobility deficits, analyze and address ROM deficits, analyze and address strength deficits, analyze and address soft tissue restrictions, analyze and cue for proper movement patterns, analyze and modify for postural abnormalities, analyze and address imbalance/dizziness, and instruct in home and community integration to address functional deficits and attain remaining goals.    Progress toward goals / Updated goals:  []  See Progress Note/Recertification    - Goal: Pt to demo right knee AROM of 0-130 deg to improve ease of gait.  Status at last note/certification: 1-133 degs  Current:   - Goal: Pt to demo right hip ER AROM of at least 30 deg to improve ease of donning/doffing shoes.  Status at last note/certification: 22 degs   Current:   - Goal: Pt to demo supine right hip flexion AROM to 110 deg to improve ease with gait/bed mobility.  Status at last note/certification:  111 degs   Current: goal progressing; 95 deg with the contralateral LE flat/extended on table (standard testing position) (4/29/24)  - Goal: Pt to demo 5/5 right hip abduction MMT to improve ease with gait.  Status at last note/certification: 4/5  Current:   - Goal: Pt to demo 5/5 right hip

## 2024-05-03 ENCOUNTER — HOSPITAL ENCOUNTER (OUTPATIENT)
Facility: HOSPITAL | Age: 64
Setting detail: RECURRING SERIES
Discharge: HOME | End: 2024-05-06
Payer: MEDICARE

## 2024-05-03 PROCEDURE — 97110 THERAPEUTIC EXERCISES: CPT

## 2024-05-03 PROCEDURE — 97140 MANUAL THERAPY 1/> REGIONS: CPT

## 2024-05-03 PROCEDURE — 97530 THERAPEUTIC ACTIVITIES: CPT

## 2024-05-03 NOTE — PROGRESS NOTES
PHYSICAL / OCCUPATIONAL THERAPY - DAILY TREATMENT NOTE (updated )    Patient Name: Valeria Dudley    Date: 5/3/2024    : 1960  Insurance: Payor: [employer] MEDICARE / [facility] / Product Type: *No Product type* /      Patient  verified yes     Visit #   Current / Total 5 10 Total Time   Time   In / Out 1:00 PM 1:42 PM 42   Pain   In / Out 0/10 0/10    Subjective Functional Status/Changes: \"I just feel the stiffness today.\"     TREATMENT AREA =  Right hip pain [M25.551]    OBJECTIVE  Modalities deferred by patient.    Therapeutic Procedures:  42  Total 40  Total [facility] Totals Reminder: bill using total billable min of TIMED therapeutic procedures (example: do not include dry needle or estim unattended, both untimed codes, in totals to left)  8-22 min = 1 unit; 23-37 min = 2 units; 38-52 min = 3 units; 53-67 min = 4 units; 68-82 min = 5 units   Tx Min Billable or 1:1 Min (if diff from Tx Min) Procedure, Rationale, Specifics   12 10 61019 Therapeutic Exercise (timed):  increase ROM, strength, coordination, balance, and proprioception to improve patient's ability to progress to PLOF and address remaining functional goals. (see flow sheet as applicable)     Details if applicable:       10 10 95608 Therapeutic Activity (timed):  use of dynamic activities replicating functional movements to increase ROM, strength, coordination, balance, and proprioception in order to improve patient's ability to progress to PLOF and address remaining functional goals.  (see flow sheet as applicable)     Details if applicable:       97112 Neuromuscular Re-Education (timed):  improve balance, coordination, kinesthetic sense, posture, core stability and proprioception to improve patient's ability to develop conscious control of individual muscles and awareness of position of extremities in order to progress to PLOF and address remaining functional goals. (see flow sheet as applicable)     Details if

## 2024-05-06 ENCOUNTER — HOSPITAL ENCOUNTER (OUTPATIENT)
Facility: HOSPITAL | Age: 64
Setting detail: RECURRING SERIES
Discharge: HOME | End: 2024-05-09
Payer: MEDICARE

## 2024-05-06 PROCEDURE — 97112 NEUROMUSCULAR REEDUCATION: CPT

## 2024-05-06 PROCEDURE — 97530 THERAPEUTIC ACTIVITIES: CPT

## 2024-05-06 NOTE — PROGRESS NOTES
concurrently with other procedures   [x] Review HEP    [] Progressed/Changed HEP, detail:    [] Other detail:       Objective Information/Functional Measures/Assessment    -Increased height of step ups to increase closed chain stability.   -Verbal cues provided to improve glute recruitment with 1/2 stance interventions.  -Pt continues to be challenged with right sidelying modified plank due to leg pain.    Patient will continue to benefit from skilled PT / OT services to modify and progress therapeutic interventions, analyze and address functional mobility deficits, analyze and address ROM deficits, analyze and address strength deficits, analyze and address soft tissue restrictions, analyze and cue for proper movement patterns, analyze and modify for postural abnormalities, analyze and address imbalance/dizziness, and instruct in home and community integration to address functional deficits and attain remaining goals.    Progress toward goals / Updated goals:  []  See Progress Note/Recertification    - Goal: Pt to demo right knee AROM of 0-130 deg to improve ease of gait.  Status at last note/certification: 1-133 degs  Current:   - Goal: Pt to demo right hip ER AROM of at least 30 deg to improve ease of donning/doffing shoes.  Status at last note/certification: 22 degs   Current:   - Goal: Pt to demo supine right hip flexion AROM to 110 deg to improve ease with gait/bed mobility.  Status at last note/certification:  111 degs   Current: goal progressing; 95 deg with the contralateral LE flat/extended on table (standard testing position) (4/29/24)  - Goal: Pt to demo 5/5 right hip abduction MMT to improve ease with gait.  Status at last note/certification: 4/5  Current:   - Goal: Pt to demo 5/5 right hip extenson MMT to improve ease with gait.  Status at last note/certification: 3-/5   Current: goal progressing; pt demos -2 deg hip extension AROM (4/29/24)  - Goal: Pt to negotiate at least 16 steps with reciprocal

## 2024-05-10 ENCOUNTER — HOSPITAL ENCOUNTER (OUTPATIENT)
Facility: HOSPITAL | Age: 64
Setting detail: RECURRING SERIES
Discharge: HOME | End: 2024-05-13
Payer: MEDICARE

## 2024-05-10 PROCEDURE — 97110 THERAPEUTIC EXERCISES: CPT

## 2024-05-10 PROCEDURE — 97140 MANUAL THERAPY 1/> REGIONS: CPT

## 2024-05-10 NOTE — PROGRESS NOTES
PHYSICAL / OCCUPATIONAL THERAPY - DAILY TREATMENT NOTE (updated )    Patient Name: Valeria Dudley    Date: 5/10/2024    : 1960  Insurance: Payor: [employer] MEDICARE / Plan: [employer] MEDICARE VALUE HMO / Product Type: *No Product type* /      Patient  verified yes     Visit #   Current / Total 7 10 Total Time   Time   In / Out 1:40 pm 2:26 pm 46   Pain   In / Out 0/10 0/10    Subjective Functional Status/Changes: Patient reports doing better.     TREATMENT AREA =  Right hip pain [M25.551]    OBJECTIVE      Therapeutic Procedures:  46  Total 38  Total [facility] Totals Reminder: bill using total billable min of TIMED therapeutic procedures (example: do not include dry needle or estim unattended, both untimed codes, in totals to left)  8-22 min = 1 unit; 23-37 min = 2 units; 38-52 min = 3 units; 53-67 min = 4 units; 68-82 min = 5 units   Tx Min Billable or 1:1 Min (if diff from Tx Min) Procedure, Rationale, Specifics   36 33 83189 Therapeutic Exercise (timed):  increase ROM, strength, coordination, balance, and proprioception to improve patient's ability to progress to PLOF and address remaining functional goals. (see flow sheet as applicable)   Details if applicable:  reassessment     10 10 01820 Manual Therapy (timed):  decrease pain, increase ROM, and increase tissue extensibility to improve patient's ability to progress to PLOF and address remaining functional goals.  The manual therapy interventions were performed at a separate and distinct time from the therapeutic activities interventions . (see flow sheet as applicable)         (R) hip flexor stretching; STM to (R) lateral gluteals in S/L           [x]  Patient Education billed concurrently with other procedures   [x] Review HEP    [] Progressed/Changed HEP, detail:    [] Other detail:       Objective Information/Functional Measures/Assessment  FOTO score: 67/100 (at last assessment, 51/100)  Improvements: stair ascent, ambulating with upright posture,

## 2024-05-10 NOTE — PROGRESS NOTES
[facility] - [facility] [place]   [address] [place] [address]  Phone: [phone] Fax: [phone]  PROGRESS NOTE  Patient Name: Valeria Dudley : 1960   Treatment/Medical Diagnosis: Right hip pain [M25.551]   Referral Source:  Payor Aroldo Moss MD  Payor: [employer] MEDICARE / Plan: [facility] MEDICARE VALUE HMO / Product Type: *No Product type* /      Date of Initial Visit: 23 Attended Visits: 39 Missed Visits: 1     SUMMARY OF TREATMENT  Pt is a pleasant 63 y.o. female who presents with c/o right hip pain. The patient reports an acute onset of right hip pain following a right hip fracture on 10/28/23 when she tripped over a cable. She had surgery on 10/29/23 to stabilize her fracture. Treatment has consisted of the followin Therapeutic Exercise, 62296 Neuromuscular Re-Education, 93176 Manual Therapy, 07000 Therapeutic Activity, 55510 Self Care/Home Management.     CURRENT STATUS  Patient has attended 39 out of 40 sessions from 23-5/10/24, making steady progress at this time. Assessment as follows:    FOTO score: 67/100 (at last assessment, 51/100)  Improvements: stair ascent, ambulating with upright posture, less thigh pain and stiffness with daily activity, car transfers  Deficits: ambulating (I), deep squatting, floor transfers, descending stairs     Hip AROM:                                           AROM (deg)                  Right  Left   Hip     Flexion in standard testing position 100       105                Abduction 13 30             Adduction  not tested not tested              Extension -15     (AAROM 0 deg) -4             ER 13 25             IR 25 22                                            Knee AROM:  (R) 0-130 deg  (L) 1-136 deg     Strength:    Right (/5) Left (/5)   Hip     Flexion 4 5             Abduction 4-  4+     Gross Adduction 5 5             Extension <3/5     Patient unable to extend the right hip

## 2024-05-13 ENCOUNTER — HOSPITAL ENCOUNTER (OUTPATIENT)
Facility: HOSPITAL | Age: 64
Setting detail: RECURRING SERIES
Discharge: HOME | End: 2024-05-16
Payer: MEDICARE

## 2024-05-13 PROCEDURE — 97116 GAIT TRAINING THERAPY: CPT

## 2024-05-13 PROCEDURE — 97112 NEUROMUSCULAR REEDUCATION: CPT

## 2024-05-13 PROCEDURE — 97110 THERAPEUTIC EXERCISES: CPT

## 2024-05-13 PROCEDURE — 97530 THERAPEUTIC ACTIVITIES: CPT

## 2024-05-13 NOTE — PROGRESS NOTES
excessive hip flexion and adduction  - Goal: Pt to demo supine right hip flexion AROM to 110 deg to improve ease with gait/bed mobility.  Status at last note/certification: 100 deg with the contralateral LE flat/extended on table (standard testing position)  - Goal: Pt to demo 5/5 right hip abduction MMT to improve ease with gait.  Status at last note/certification: 4-/5 glute medius strength; 5/5 with compensation  - Goal: Pt to demo 5/5 right hip extenson MMT to improve ease with gait.  Status at last note/certification: pt demos -4 deg hip extension AROM  PN due 6/10/24  RC due 6/13/24  Auth due 6/17/24    PLAN  [x] Continue plan of care  [x]  Upgrade activities as tolerated  []  Discharge due to :  []  Other:    Candido Dwyer PT    5/13/2024    9:54 AM    Future Appointments   Date Time Provider Department Center   5/13/2024 12:20 PM Candido Dwyer PT [facility]   5/17/2024  1:40 PM Jose Luis Parkinson PTA [facility]   5/21/2024  1:00 PM Jose Luis Parkinson PTA [facility]   5/24/2024  1:00 PM Candido Dwyer PT [facility]   5/28/2024 12:20 PM Candido Dwyer PT [facility]   5/31/2024  1:00 PM Candido Dwyer PT [facility]   6/3/2024 12:20 PM Candido Dwyer PT [facility]   6/6/2024  1:40 PM Candido Dwyer PT [facility]   6/10/2024  1:40 PM Jose Luis Parkinson PTA [facility]   6/14/2024  1:40 PM Jose Luis Parkinson PTA [facility]

## 2024-05-17 ENCOUNTER — HOSPITAL ENCOUNTER (OUTPATIENT)
Facility: HOSPITAL | Age: 64
Setting detail: RECURRING SERIES
Discharge: HOME | End: 2024-05-20
Payer: MEDICARE

## 2024-05-17 PROCEDURE — 97116 GAIT TRAINING THERAPY: CPT

## 2024-05-17 PROCEDURE — 97110 THERAPEUTIC EXERCISES: CPT

## 2024-05-17 PROCEDURE — 97112 NEUROMUSCULAR REEDUCATION: CPT

## 2024-05-17 NOTE — PROGRESS NOTES
PHYSICAL / OCCUPATIONAL THERAPY - DAILY TREATMENT NOTE (updated )    Patient Name: Valeria Dudley    Date: 2024    : 1960  Insurance: Payor: [employer] MEDICARE / Plan: [employer] MEDICARE VALUE HMO / Product Type: *No Product type* /      Patient  verified yes     Visit #   Current / Total 2 10 Total Time   Time   In / Out 1:40 pm 2:23 pm 43   Pain   In / Out 1/10 010    Subjective Functional Status/Changes: \"I walked up the stairs. I had to go both feet each step, but then I could go (*patient motions step-over-step pattern)     TREATMENT AREA =  Right hip pain [M25.551]    OBJECTIVE    Therapeutic Procedures:  43  Total 38  Total [facility] Totals Reminder: bill using total billable min of TIMED therapeutic procedures (example: do not include dry needle or estim unattended, both untimed codes, in totals to left)  8-22 min = 1 unit; 23-37 min = 2 units; 38-52 min = 3 units; 53-67 min = 4 units; 68-82 min = 5 units   Tx Min Billable or 1:1 Min (if diff from Tx Min) Procedure, Rationale, Specifics   8  73060 Therapeutic Exercise (timed):  increase ROM, strength, coordination, balance, and proprioception to improve patient's ability to progress to PLOF and address remaining functional goals. (see flow sheet as applicable)   Details if applicable:       8 3 61674 Therapeutic Activity (timed):  use of dynamic activities replicating functional movements to increase ROM, strength, coordination, balance, and proprioception in order to improve patient's ability to progress to PLOF and address remaining functional goals.  (see flow sheet as applicable)     Details if applicable:       10 10 69735 Neuromuscular Re-Education (timed):  improve balance, coordination, kinesthetic sense, posture, core stability and proprioception to improve patient's ability to develop conscious control of individual muscles and awareness of position of extremities in order to progress to PLOF and address remaining functional goals.

## 2024-05-21 ENCOUNTER — HOSPITAL ENCOUNTER (OUTPATIENT)
Facility: HOSPITAL | Age: 64
Setting detail: RECURRING SERIES
Discharge: HOME | End: 2024-05-24
Payer: MEDICARE

## 2024-05-21 PROCEDURE — 97112 NEUROMUSCULAR REEDUCATION: CPT

## 2024-05-21 PROCEDURE — 97110 THERAPEUTIC EXERCISES: CPT

## 2024-05-21 PROCEDURE — 97140 MANUAL THERAPY 1/> REGIONS: CPT

## 2024-05-21 NOTE — PROGRESS NOTES
supine right hip flexion AROM to 110 deg to improve ease with gait/bed mobility.  Status at last note/certification: 100 deg with the contralateral LE flat/extended on table (standard testing position)  - Goal: Pt to demo 5/5 right hip abduction MMT to improve ease with gait.  Status at last note/certification: 4-/5 glute medius strength; 5/5 with compensation  Current: goal progressing; pt demonstrates ability to perform S/L donkey kick for full set with normal movement patterns without rest break (5/21/24)  - Goal: Pt to demo 5/5 right hip extenson MMT to improve ease with gait.  Status at last note/certification: pt demos -4 deg hip extension AROM  Current: good response to external support to aid in hip extension (5/21/24)  PN due 6/10/24  RC due 6/13/24  Auth due 6/17/24    PLAN  [x] Continue plan of care  [x]  Upgrade activities as tolerated  []  Discharge due to :  [x]  Other: consider SFMA rolling patterns NV    Jose Luis Parkinson PTA    5/21/2024    1:54 PM    Future Appointments   Date Time Provider Department Center   5/24/2024  1:00 PM Candido Dwyer PT [facility]   5/28/2024 12:20 PM Candido Dwyer PT [facility]   5/31/2024  1:00 PM Candido Dwyer PT [facility]   6/3/2024 12:20 PM Candido Dwyer PT [facility]   6/6/2024  1:40 PM Candido Dwyer PT [facility]   6/10/2024  1:40 PM Jose Luis Parkinson PTA [facility]   6/14/2024  1:40 PM Jose Luis Parkinson PTA [facility]

## 2024-05-24 ENCOUNTER — HOSPITAL ENCOUNTER (OUTPATIENT)
Facility: HOSPITAL | Age: 64
Setting detail: RECURRING SERIES
Discharge: HOME | End: 2024-05-27
Payer: MEDICARE

## 2024-05-24 PROCEDURE — 97110 THERAPEUTIC EXERCISES: CPT

## 2024-05-24 PROCEDURE — 97112 NEUROMUSCULAR REEDUCATION: CPT

## 2024-05-24 PROCEDURE — 97116 GAIT TRAINING THERAPY: CPT

## 2024-05-24 NOTE — PROGRESS NOTES
demonstrates ability to perform S/L donkey kick for full set with normal movement patterns without rest break (5/21/24)  - Goal: Pt to demo 5/5 right hip extenson MMT to improve ease with gait.  Status at last note/certification: pt demos -4 deg hip extension AROM  Current: good response to external support to aid in hip extension (5/21/24)  PN due 6/10/24  RC due 6/13/24  Auth due 6/17/24    PLAN  [x] Continue plan of care  [x]  Upgrade activities as tolerated  []  Discharge due to :  []  Other:    Candido Dwyer PT    5/24/2024    7:23 AM    Future Appointments   Date Time Provider Department Center   5/24/2024  1:00 PM Candido Dwyer PT [facility]   5/28/2024 12:20 PM Candido Dwyer PT [facility]   5/31/2024  1:00 PM Candido Dwyer PT [facility]   6/3/2024 12:20 PM Candido Dwyer PT [facility]   6/6/2024  1:40 PM Candido Dwyer PT [facility]   6/10/2024  1:40 PM Jose Luis Parkinson PTA [facility]   6/14/2024  1:40 PM Jose Luis Parkinson PTA [facility]

## 2024-05-28 ENCOUNTER — HOSPITAL ENCOUNTER (OUTPATIENT)
Facility: HOSPITAL | Age: 64
Setting detail: RECURRING SERIES
Discharge: HOME | End: 2024-05-31
Payer: MEDICARE

## 2024-05-28 PROCEDURE — 97112 NEUROMUSCULAR REEDUCATION: CPT

## 2024-05-28 PROCEDURE — 97116 GAIT TRAINING THERAPY: CPT

## 2024-05-28 NOTE — PROGRESS NOTES
PHYSICAL / OCCUPATIONAL THERAPY - DAILY TREATMENT NOTE (updated )    Patient Name: Valeria Dudley    Date: 2024    : 1960  Insurance: Payor: [employer] / Plan: [employer] VALUE HMO / Product Type: *No Product type* /      Patient  verified yes     Visit #   Current / Total 5 10 Total Time   Time   In / Out 12:22 1:02 40   Pain   In / Out 0 0    Subjective Functional Status/Changes: I had a restful weekend.     TREATMENT AREA =  Right hip pain [M25.551]    OBJECTIVE    Therapeutic Procedures:       40  Total 39  Total  BC Totals Reminder: bill using total billable min of TIMED therapeutic procedures (example: do not include dry needle or estim unattended, both untimed codes, in totals to left)  8-22 min = 1 unit; 23-37 min = 2 units; 38-52 min = 3 units; 53-67 min = 4 units; 68-82 min = 5 units   Tx Min Billable or 1:1 Min (if diff from Tx Min) Procedure, Rationale, Specifics   3 2 32310 Therapeutic Exercise (timed):  increase ROM, strength, coordination, balance, and proprioception to improve patient's ability to progress to PLOF and address remaining functional goals. (see flow sheet as applicable)   Details if applicable:        17062 Neuromuscular Re-Education (timed):  improve balance, coordination, kinesthetic sense, posture, core stability and proprioception to improve patient's ability to develop conscious control of individual muscles and awareness of position of extremities in order to progress to PLOF and address remaining functional goals. (see flow sheet as applicable)     Details if applicable:       10 10 73415 Gait Training (timed):      200x 4 bouts, 100 feet x 1 bout without assistive device over level surfaces with CGA to SBA level of assist. Cuing for increased left step length, improved arm swing during gait with tiff lrod assist, improved right stance phase with tactile cues. To improve safety and dynamic movement with household/community ambulation.

## 2024-05-31 ENCOUNTER — HOSPITAL ENCOUNTER (OUTPATIENT)
Facility: HOSPITAL | Age: 64
Setting detail: RECURRING SERIES
End: 2024-05-31
Payer: MEDICARE

## 2024-05-31 PROCEDURE — 97140 MANUAL THERAPY 1/> REGIONS: CPT

## 2024-05-31 PROCEDURE — 97112 NEUROMUSCULAR REEDUCATION: CPT

## 2024-05-31 PROCEDURE — 97110 THERAPEUTIC EXERCISES: CPT

## 2024-05-31 NOTE — PROGRESS NOTES
PHYSICAL / OCCUPATIONAL THERAPY - DAILY TREATMENT NOTE (updated )    Patient Name: Valeria Dudley    Date: 2024    : 1960  Insurance: Payor: [employer] / Plan: [employer] MED[employer] VALUE HMO / Product Type: *No Product type* /      Patient  verified yes     Visit #   Current / Total 6 10 Total Time   Time   In / Out 12:47 1:50 63   Pain   In / Out 0 0    Subjective Functional Status/Changes: My thigh is sore from walking with the new heel lift I got     TREATMENT AREA =  Right hip pain [M25.551]    OBJECTIVE    Therapeutic Procedures:  63  Total 54  Total  BC Totals Reminder: bill using total billable min of TIMED therapeutic procedures (example: do not include dry needle or estim unattended, both untimed codes, in totals to left)  8-22 min = 1 unit; 23-37 min = 2 units; 38-52 min = 3 units; 53-67 min = 4 units; 68-82 min = 5 units   Tx Min Billable or 1:1 Min (if diff from Tx Min) Procedure, Rationale, Specifics   12 12 60588 Therapeutic Exercise (timed):  increase ROM, strength, coordination, balance, and proprioception to improve patient's ability to progress to PLOF and address remaining functional goals. (see flow sheet as applicable)   Details if applicable:       36 27 63473 Neuromuscular Re-Education (timed):  improve balance, coordination, kinesthetic sense, posture, core stability and proprioception to improve patient's ability to develop conscious control of individual muscles and awareness of position of extremities in order to progress to PLOF and address remaining functional goals. (see flow sheet as applicable)     Details if applicable:       15 15 03715 Manual Therapy (timed):  decrease pain, increase ROM, increase tissue extensibility, decrease trigger points, and increase postural awareness to improve patient's ability to progress to PLOF and address remaining functional goals.  The manual therapy interventions were performed at a separate and distinct time from the  mobility.  Status at last note/certification: 100 deg with the contralateral LE flat/extended on table (standard testing position)  - Goal: Pt to demo 5/5 right hip abduction MMT to improve ease with gait.  Status at last note/certification: 4-/5 glute medius strength; 5/5 with compensation  Current: goal progressing; pt demonstrates ability to perform S/L donkey kick for full set with normal movement patterns without rest break (5/21/24)  - Goal: Pt to demo 5/5 right hip extenson MMT to improve ease with gait.  Status at last note/certification: pt demos -4 deg hip extension AROM  Current: lacking 10 deg hip extension due to fatigue    PN due 6/10/24  RC due 6/13/24  Auth due 6/17/24    PLAN  [x] Continue plan of care  [x]  Upgrade activities as tolerated  []  Discharge due to :  []  Other:    Candido Dwyer PT    5/31/2024    12:54 PM    Future Appointments   Date Time Provider Department Center   5/31/2024  1:00 PM Candido Dwyer PT [facility]   6/3/2024 12:20 PM Candido Dwyer PT [facility]   6/6/2024  1:40 PM Candido Dwyer PT [facility]   6/10/2024  1:40 PM Jose Luis Parkinson PTA [facility]   6/14/2024  1:40 PM Jose Luis Parkinson PTA [facility]

## 2024-06-03 ENCOUNTER — APPOINTMENT (OUTPATIENT)
Facility: HOSPITAL | Age: 64
End: 2024-06-03
Payer: MEDICARE

## 2024-06-06 ENCOUNTER — HOSPITAL ENCOUNTER (OUTPATIENT)
Facility: HOSPITAL | Age: 64
Setting detail: RECURRING SERIES
Discharge: HOME | End: 2024-06-09
Payer: MEDICARE

## 2024-06-06 PROCEDURE — 97112 NEUROMUSCULAR REEDUCATION: CPT

## 2024-06-06 PROCEDURE — 97140 MANUAL THERAPY 1/> REGIONS: CPT

## 2024-06-06 PROCEDURE — 97110 THERAPEUTIC EXERCISES: CPT

## 2024-06-06 NOTE — PROGRESS NOTES
PHYSICAL / OCCUPATIONAL THERAPY - DAILY TREATMENT NOTE (updated )    Patient Name: Valeria Dudley    Date: 2024    : 1960  Insurance: Payor: [employer] / Plan: [employer] MEDICARE VALUE HMO / Product Type: *No Product type* /      Patient  verified yes     Visit #   Current / Total 7 10 Total Time   Time   In / Out 1:45 2;25 40   Pain   In / Out 0 0    Subjective Functional Status/Changes: I felt better after the hands on massage last session, I felt like my leg length was better.     TREATMENT AREA =  Right hip pain [M25.551]    OBJECTIVE      Therapeutic Procedures:  40  Total 40  Total MC BC Totals Reminder: bill using total billable min of TIMED therapeutic procedures (example: do not include dry needle or estim unattended, both untimed codes, in totals to left)  8-22 min = 1 unit; 23-37 min = 2 units; 38-52 min = 3 units; 53-67 min = 4 units; 68-82 min = 5 units   Tx Min Billable or 1:1 Min (if diff from Tx Min) Procedure, Rationale, Specifics   15  18052 Therapeutic Exercise (timed):  increase ROM, strength, coordination, balance, and proprioception to improve patient's ability to progress to PLOF and address remaining functional goals. (see flow sheet as applicable)   Details if applicable:       13  36056 Neuromuscular Re-Education (timed):  improve balance, coordination, kinesthetic sense, posture, core stability and proprioception to improve patient's ability to develop conscious control of individual muscles and awareness of position of extremities in order to progress to PLOF and address remaining functional goals. (see flow sheet as applicable)     Details if applicable:       12  40006 Manual Therapy (timed):  decrease pain, increase ROM, increase tissue extensibility, decrease trigger points, and increase postural awareness to improve patient's ability to progress to PLOF and address remaining functional goals.  The manual therapy interventions were performed at a separate and

## 2024-06-10 ENCOUNTER — HOSPITAL ENCOUNTER (OUTPATIENT)
Facility: HOSPITAL | Age: 64
Setting detail: RECURRING SERIES
Discharge: HOME | End: 2024-06-13
Payer: MEDICARE

## 2024-06-10 PROCEDURE — 97112 NEUROMUSCULAR REEDUCATION: CPT

## 2024-06-10 PROCEDURE — 97110 THERAPEUTIC EXERCISES: CPT

## 2024-06-10 NOTE — PROGRESS NOTES
[facility] [place]   [address] [place] [address]  Phone: [phone] Fax: [phone]  PROGRESS NOTE  Patient Name: Valeria Dudley : 1960   Treatment/Medical Diagnosis: Right hip pain [M25.551]   Referral Source:  Payor Aroldo Moss MD  Payor: [employer] / Plan: [facility] / Product Type: *No Product type* /      Date of Initial Visit: 23 Attended Visits: 47 Missed Visits: 1     SUMMARY OF TREATMENT  Pt is a pleasant 63 y.o. female who presents with c/o right hip pain. The patient reports an acute onset of right hip pain following a right hip fracture on 10/28/23 when she tripped over a cable. She had surgery on 10/29/23 to stabilize her fracture. Treatment has consisted of the followin Therapeutic Exercise, 22916 Neuromuscular Re-Education, 01431 Manual Therapy, 18631 Therapeutic Activity, 92077 Self Care/Home Management.     CURRENT STATUS  Patient has attended 47 out of 48 sessions from 23-6/10/23, making slow progress at this time. Patient has progress to (I) ambulation for short distances, although prefers use of SPC for long-distance ambulation out of the home. The patient demonstrates difficulty achieving hip extension AROM in the prone position, likely contributing to difficulty with normalized gait and stair ascent. Assessment as follows:    FOTO score: 57/100 (at last assessment, 67/100)  Improvements: ambulating (I) for short distances, less back stiffness  Deficits: deep squatting, floor transfers, impaired (I) gait     Hip AROM:                                           AROM (deg)                  Right  Left   Hip     Flexion in standard testing position 115       110                Abduction 12 30             Adduction  not tested not tested              Extension -15 -4             ER 13 36             IR 22 18                                            Knee AROM:  (R) 0-135 
Extension    [] Knee Hyperextension  [x] Trendelenberg       [x] Compensated   [] Uncompensated     Late Stance   [] Normal       [x] Decreased Terminal Hip Extension     Functional Squat: to 19 inch chair, (I), normal movement pattern     Stair Negotiation: step-over-step pattern ascending, (B) UE assistance, step-over-step descending, with (B) UE assistance     Balance: TUG Test: 12 seconds (I)    SLS: (R) 2 sec, (L) 14 sec      Patient will continue to benefit from skilled PT / OT services to modify and progress therapeutic interventions, analyze and address functional mobility deficits, analyze and address ROM deficits, analyze and address strength deficits, analyze and address soft tissue restrictions, analyze and cue for proper movement patterns, analyze and modify for postural abnormalities, analyze and address imbalance/dizziness, and instruct in home and community integration to address functional deficits and attain remaining goals.    Progress toward goals / Updated goals:  [x]  See Progress Note/Recertification  - Goal: Pt to demo right hip ER AROM of at least 30 deg to improve ease of donning/doffing shoes.  Status at last note/certification: 13 degrees when cued to avoid excessive hip flexion and adduction  Current: remains 14 deg (5/31/24)  - Goal: Pt to demo supine right hip flexion AROM to 110 deg to improve ease with gait/bed mobility.  Status at last note/certification: 100 deg with the contralateral LE flat/extended on table (standard testing position)  Current: addressing with manual therapy (6/6/24)  - Goal: Pt to demo 5/5 right hip abduction MMT to improve ease with gait.  Status at last note/certification: 4-/5 glute medius strength; 5/5 with compensation  Current: goal progressing; pt demonstrates ability to perform S/L donkey kick for full set with normal movement patterns without rest break (5/21/24)  - Goal: Pt to demo 5/5 right hip extenson MMT to improve ease with gait.  Status at last

## 2024-06-14 ENCOUNTER — HOSPITAL ENCOUNTER (OUTPATIENT)
Facility: HOSPITAL | Age: 64
Setting detail: RECURRING SERIES
Discharge: HOME | End: 2024-06-17
Payer: MEDICARE

## 2024-06-14 PROCEDURE — 97112 NEUROMUSCULAR REEDUCATION: CPT

## 2024-06-14 PROCEDURE — 97110 THERAPEUTIC EXERCISES: CPT

## 2024-06-14 NOTE — PROGRESS NOTES
improve ease of donning/doffing shoes.  Status at last note/certification: 13 degrees when cued to avoid excessive hip flexion and adduction  Current: remains 14 deg (5/31/24)  - Goal: Pt to demo supine right hip flexion AROM to 110 deg to improve ease with gait/bed mobility.  Status at last note/certification: 100 deg with the contralateral LE flat/extended on table (standard testing position)  Current: addressing with manual therapy (6/6/24)  - Goal: Pt to demo 5/5 right hip abduction MMT to improve ease with gait.  Status at last note/certification: 4-/5 glute medius strength; 5/5 with compensation  Current: goal progressing; pt demonstrates ability to perform S/L donkey kick for full set with normal movement patterns without rest break (5/21/24)  - Goal: Pt to demo 5/5 right hip extenson MMT to improve ease with gait.  Status at last note/certification: pt demos -4 deg hip extension AROM  Current: lacking 10 deg hip extension due to fatigue     PN due 6/10/24  RC due 6/13/24  Auth due 6/17/24    PLAN  [x]  Discharge due to: program complete; patient unable to schedule last session within insurance expiration date, therefore, will be DC at this time    Jose Luis Parkinson, PTA    6/14/2024    2:03 PM    No future appointments.